# Patient Record
Sex: FEMALE | Race: WHITE | Employment: FULL TIME | URBAN - METROPOLITAN AREA
[De-identification: names, ages, dates, MRNs, and addresses within clinical notes are randomized per-mention and may not be internally consistent; named-entity substitution may affect disease eponyms.]

---

## 2018-06-08 ENCOUNTER — APPOINTMENT (OUTPATIENT)
Dept: GENERAL RADIOLOGY | Age: 43
End: 2018-06-08

## 2018-06-08 ENCOUNTER — APPOINTMENT (OUTPATIENT)
Dept: CT IMAGING | Age: 43
End: 2018-06-08

## 2018-06-08 ENCOUNTER — HOSPITAL ENCOUNTER (EMERGENCY)
Age: 43
Discharge: HOME | End: 2018-06-08
Attending: EMERGENCY MEDICINE

## 2018-06-08 VITALS
DIASTOLIC BLOOD PRESSURE: 90 MMHG | OXYGEN SATURATION: 95 % | SYSTOLIC BLOOD PRESSURE: 135 MMHG | WEIGHT: 177 LBS | HEIGHT: 60 IN | RESPIRATION RATE: 16 BRPM | HEART RATE: 75 BPM | BODY MASS INDEX: 34.75 KG/M2 | TEMPERATURE: 97.6 F

## 2018-06-08 DIAGNOSIS — R10.11 RIGHT UPPER QUADRANT ABDOMINAL PAIN: Primary | ICD-10-CM

## 2018-06-08 LAB
ALBUMIN SERPL-MCNC: 4.1 G/DL (ref 3.5–5.2)
ALP BLD-CCNC: 68 U/L (ref 35–104)
ALT SERPL-CCNC: 25 U/L (ref 0–32)
ANION GAP SERPL CALCULATED.3IONS-SCNC: 15 MMOL/L (ref 7–16)
AST SERPL-CCNC: 44 U/L (ref 0–31)
BASOPHILS ABSOLUTE: 0.04 E9/L (ref 0–0.2)
BASOPHILS RELATIVE PERCENT: 0.3 % (ref 0–2)
BILIRUB SERPL-MCNC: <0.2 MG/DL (ref 0–1.2)
BUN BLDV-MCNC: 15 MG/DL (ref 6–20)
CALCIUM SERPL-MCNC: 10.5 MG/DL (ref 8.6–10.2)
CHLORIDE BLD-SCNC: 110 MMOL/L (ref 98–107)
CO2: 21 MMOL/L (ref 22–29)
CREAT SERPL-MCNC: 0.7 MG/DL (ref 0.5–1)
EKG ATRIAL RATE: 85 BPM
EKG P AXIS: 51 DEGREES
EKG P-R INTERVAL: 142 MS
EKG Q-T INTERVAL: 388 MS
EKG QRS DURATION: 142 MS
EKG QTC CALCULATION (BAZETT): 461 MS
EKG R AXIS: 24 DEGREES
EKG T AXIS: 3 DEGREES
EKG VENTRICULAR RATE: 85 BPM
EOSINOPHILS ABSOLUTE: 0.05 E9/L (ref 0.05–0.5)
EOSINOPHILS RELATIVE PERCENT: 0.4 % (ref 0–6)
GFR AFRICAN AMERICAN: >60
GFR NON-AFRICAN AMERICAN: >60 ML/MIN/1.73
GLUCOSE BLD-MCNC: 118 MG/DL (ref 74–109)
HCT VFR BLD CALC: 45.4 % (ref 34–48)
HEMOGLOBIN: 14.8 G/DL (ref 11.5–15.5)
IMMATURE GRANULOCYTES #: 0.03 E9/L
IMMATURE GRANULOCYTES %: 0.2 % (ref 0–5)
LACTIC ACID: 0.6 MMOL/L (ref 0.5–2.2)
LIPASE: 45 U/L (ref 13–60)
LYMPHOCYTES ABSOLUTE: 1.11 E9/L (ref 1.5–4)
LYMPHOCYTES RELATIVE PERCENT: 8.2 % (ref 20–42)
MCH RBC QN AUTO: 32.2 PG (ref 26–35)
MCHC RBC AUTO-ENTMCNC: 32.6 % (ref 32–34.5)
MCV RBC AUTO: 98.7 FL (ref 80–99.9)
MONOCYTES ABSOLUTE: 0.67 E9/L (ref 0.1–0.95)
MONOCYTES RELATIVE PERCENT: 4.9 % (ref 2–12)
NEUTROPHILS ABSOLUTE: 11.65 E9/L (ref 1.8–7.3)
NEUTROPHILS RELATIVE PERCENT: 86 % (ref 43–80)
PDW BLD-RTO: 12 FL (ref 11.5–15)
PLATELET # BLD: 269 E9/L (ref 130–450)
PMV BLD AUTO: 10.1 FL (ref 7–12)
POTASSIUM SERPL-SCNC: 3.7 MMOL/L (ref 3.5–5)
RBC # BLD: 4.6 E12/L (ref 3.5–5.5)
SODIUM BLD-SCNC: 146 MMOL/L (ref 132–146)
TOTAL PROTEIN: 6.7 G/DL (ref 6.4–8.3)
TROPONIN: <0.01 NG/ML (ref 0–0.03)
WBC # BLD: 13.6 E9/L (ref 4.5–11.5)

## 2018-06-08 PROCEDURE — 71045 X-RAY EXAM CHEST 1 VIEW: CPT

## 2018-06-08 PROCEDURE — 96374 THER/PROPH/DIAG INJ IV PUSH: CPT

## 2018-06-08 PROCEDURE — 6360000002 HC RX W HCPCS: Performed by: PHYSICIAN ASSISTANT

## 2018-06-08 PROCEDURE — 74177 CT ABD & PELVIS W/CONTRAST: CPT

## 2018-06-08 PROCEDURE — 84484 ASSAY OF TROPONIN QUANT: CPT

## 2018-06-08 PROCEDURE — 99285 EMERGENCY DEPT VISIT HI MDM: CPT

## 2018-06-08 PROCEDURE — 83690 ASSAY OF LIPASE: CPT

## 2018-06-08 PROCEDURE — 36415 COLL VENOUS BLD VENIPUNCTURE: CPT

## 2018-06-08 PROCEDURE — 6360000004 HC RX CONTRAST MEDICATION: Performed by: RADIOLOGY

## 2018-06-08 PROCEDURE — 85025 COMPLETE CBC W/AUTO DIFF WBC: CPT

## 2018-06-08 PROCEDURE — 83605 ASSAY OF LACTIC ACID: CPT

## 2018-06-08 PROCEDURE — 80053 COMPREHEN METABOLIC PANEL: CPT

## 2018-06-08 RX ORDER — ONDANSETRON 2 MG/ML
4 INJECTION INTRAMUSCULAR; INTRAVENOUS ONCE
Status: COMPLETED | OUTPATIENT
Start: 2018-06-08 | End: 2018-06-08

## 2018-06-08 RX ORDER — HYDROCODONE BITARTRATE AND ACETAMINOPHEN 5; 325 MG/1; MG/1
1 TABLET ORAL EVERY 6 HOURS PRN
Qty: 10 TABLET | Refills: 0 | Status: SHIPPED | OUTPATIENT
Start: 2018-06-08 | End: 2018-06-11

## 2018-06-08 RX ADMIN — IOPAMIDOL 80 ML: 755 INJECTION, SOLUTION INTRAVENOUS at 21:47

## 2018-06-08 RX ADMIN — IOHEXOL 50 ML: 240 INJECTION, SOLUTION INTRATHECAL; INTRAVASCULAR; INTRAVENOUS; ORAL at 21:47

## 2018-06-08 RX ADMIN — ONDANSETRON 4 MG: 2 INJECTION INTRAMUSCULAR; INTRAVENOUS at 20:36

## 2018-06-08 ASSESSMENT — PAIN DESCRIPTION - LOCATION: LOCATION: ABDOMEN

## 2018-06-08 ASSESSMENT — PAIN SCALES - GENERAL: PAINLEVEL_OUTOF10: 10

## 2018-06-08 ASSESSMENT — PAIN DESCRIPTION - PAIN TYPE: TYPE: ACUTE PAIN

## 2018-06-11 ENCOUNTER — TELEPHONE (OUTPATIENT)
Dept: SURGERY | Age: 43
End: 2018-06-11

## 2018-06-18 ENCOUNTER — INITIAL CONSULT (OUTPATIENT)
Dept: SURGERY | Age: 43
End: 2018-06-18

## 2018-06-18 VITALS
OXYGEN SATURATION: 96 % | BODY MASS INDEX: 33.38 KG/M2 | SYSTOLIC BLOOD PRESSURE: 115 MMHG | HEIGHT: 60 IN | HEART RATE: 81 BPM | DIASTOLIC BLOOD PRESSURE: 62 MMHG | WEIGHT: 170 LBS

## 2018-06-18 DIAGNOSIS — R93.5 ABNORMAL CT OF THE ABDOMEN: ICD-10-CM

## 2018-06-18 DIAGNOSIS — R10.11 RUQ PAIN: Primary | ICD-10-CM

## 2018-06-18 PROCEDURE — 99214 OFFICE O/P EST MOD 30 MIN: CPT | Performed by: SURGERY

## 2018-06-19 ENCOUNTER — TELEPHONE (OUTPATIENT)
Dept: SURGERY | Age: 43
End: 2018-06-19

## 2020-07-03 ENCOUNTER — HOSPITAL ENCOUNTER (EMERGENCY)
Age: 45
Discharge: LEFT BEFORE DISCHARGE | End: 2020-07-03

## 2020-07-03 VITALS — TEMPERATURE: 99.8 F

## 2020-07-07 ENCOUNTER — HOSPITAL ENCOUNTER (EMERGENCY)
Age: 45
Discharge: HOME | End: 2020-07-07
Attending: EMERGENCY MEDICINE

## 2020-07-07 ENCOUNTER — APPOINTMENT (OUTPATIENT)
Dept: CT IMAGING | Age: 45
End: 2020-07-07

## 2020-07-07 VITALS
HEART RATE: 75 BPM | WEIGHT: 165 LBS | OXYGEN SATURATION: 99 % | TEMPERATURE: 98.2 F | BODY MASS INDEX: 32.22 KG/M2 | RESPIRATION RATE: 19 BRPM | SYSTOLIC BLOOD PRESSURE: 135 MMHG | DIASTOLIC BLOOD PRESSURE: 84 MMHG

## 2020-07-07 LAB
BACTERIA: ABNORMAL /HPF
BASOPHILS ABSOLUTE: 0.04 E9/L (ref 0–0.2)
BASOPHILS RELATIVE PERCENT: 0.6 % (ref 0–2)
BILIRUBIN URINE: NEGATIVE
BLOOD, URINE: ABNORMAL
CLARITY: CLEAR
COLOR: YELLOW
EOSINOPHILS ABSOLUTE: 0.08 E9/L (ref 0.05–0.5)
EOSINOPHILS RELATIVE PERCENT: 1.3 % (ref 0–6)
EPITHELIAL CELLS, UA: ABNORMAL /HPF
GLUCOSE URINE: NEGATIVE MG/DL
HCT VFR BLD CALC: 30.8 % (ref 34–48)
HEMOGLOBIN: 9.8 G/DL (ref 11.5–15.5)
IMMATURE GRANULOCYTES #: 0.1 E9/L
IMMATURE GRANULOCYTES %: 1.6 % (ref 0–5)
KETONES, URINE: NEGATIVE MG/DL
LEUKOCYTE ESTERASE, URINE: NEGATIVE
LYMPHOCYTES ABSOLUTE: 1.98 E9/L (ref 1.5–4)
LYMPHOCYTES RELATIVE PERCENT: 31.1 % (ref 20–42)
MCH RBC QN AUTO: 29.1 PG (ref 26–35)
MCHC RBC AUTO-ENTMCNC: 31.8 % (ref 32–34.5)
MCV RBC AUTO: 91.4 FL (ref 80–99.9)
MONOCYTES ABSOLUTE: 0.48 E9/L (ref 0.1–0.95)
MONOCYTES RELATIVE PERCENT: 7.5 % (ref 2–12)
NEUTROPHILS ABSOLUTE: 3.68 E9/L (ref 1.8–7.3)
NEUTROPHILS RELATIVE PERCENT: 57.9 % (ref 43–80)
NITRITE, URINE: POSITIVE
PDW BLD-RTO: 17.1 FL (ref 11.5–15)
PH UA: 5.5 (ref 5–9)
PLATELET # BLD: 510 E9/L (ref 130–450)
PMV BLD AUTO: 8.9 FL (ref 7–12)
PROTEIN UA: NEGATIVE MG/DL
RBC # BLD: 3.37 E12/L (ref 3.5–5.5)
RBC UA: ABNORMAL /HPF (ref 0–2)
SPECIFIC GRAVITY UA: 1.02 (ref 1–1.03)
UROBILINOGEN, URINE: 0.2 E.U./DL
WBC # BLD: 6.4 E9/L (ref 4.5–11.5)
WBC UA: ABNORMAL /HPF (ref 0–5)

## 2020-07-07 PROCEDURE — 74176 CT ABD & PELVIS W/O CONTRAST: CPT

## 2020-07-07 PROCEDURE — 81001 URINALYSIS AUTO W/SCOPE: CPT

## 2020-07-07 PROCEDURE — 99285 EMERGENCY DEPT VISIT HI MDM: CPT

## 2020-07-07 PROCEDURE — 85025 COMPLETE CBC W/AUTO DIFF WBC: CPT

## 2020-07-07 PROCEDURE — 6370000000 HC RX 637 (ALT 250 FOR IP): Performed by: EMERGENCY MEDICINE

## 2020-07-07 PROCEDURE — 71250 CT THORAX DX C-: CPT

## 2020-07-07 RX ORDER — TRAMADOL HYDROCHLORIDE 50 MG/1
50 TABLET ORAL ONCE
Status: COMPLETED | OUTPATIENT
Start: 2020-07-07 | End: 2020-07-07

## 2020-07-07 RX ORDER — AMOXICILLIN AND CLAVULANATE POTASSIUM 875; 125 MG/1; MG/1
1 TABLET, FILM COATED ORAL 2 TIMES DAILY
Qty: 20 TABLET | Refills: 0 | Status: SHIPPED | OUTPATIENT
Start: 2020-07-07 | End: 2020-07-17

## 2020-07-07 RX ORDER — IBUPROFEN 600 MG/1
600 TABLET ORAL EVERY 6 HOURS PRN
Qty: 30 TABLET | Refills: 0 | Status: SHIPPED | OUTPATIENT
Start: 2020-07-07

## 2020-07-07 RX ADMIN — TRAMADOL HYDROCHLORIDE 50 MG: 50 TABLET, FILM COATED ORAL at 14:38

## 2020-07-07 ASSESSMENT — PAIN DESCRIPTION - PAIN TYPE
TYPE: ACUTE PAIN
TYPE: ACUTE PAIN

## 2020-07-07 ASSESSMENT — PAIN SCALES - GENERAL
PAINLEVEL_OUTOF10: 5
PAINLEVEL_OUTOF10: 10
PAINLEVEL_OUTOF10: 10

## 2020-07-07 ASSESSMENT — PAIN DESCRIPTION - PROGRESSION: CLINICAL_PROGRESSION: NOT CHANGED

## 2020-07-07 ASSESSMENT — PAIN DESCRIPTION - FREQUENCY
FREQUENCY: CONTINUOUS
FREQUENCY: CONTINUOUS

## 2020-07-07 ASSESSMENT — PAIN DESCRIPTION - DESCRIPTORS
DESCRIPTORS: SHARP
DESCRIPTORS: THROBBING

## 2020-07-07 ASSESSMENT — PAIN DESCRIPTION - ONSET: ONSET: SUDDEN

## 2020-07-07 ASSESSMENT — PAIN DESCRIPTION - LOCATION
LOCATION: HIP
LOCATION: HIP;LEG

## 2020-07-07 ASSESSMENT — PAIN - FUNCTIONAL ASSESSMENT: PAIN_FUNCTIONAL_ASSESSMENT: PREVENTS OR INTERFERES SOME ACTIVE ACTIVITIES AND ADLS

## 2020-07-07 ASSESSMENT — PAIN DESCRIPTION - ORIENTATION: ORIENTATION: LEFT
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
## 2020-07-07 NOTE — ED NOTES
Bed: H2  Expected date:   Expected time:   Means of arrival:   Comments:  Ruslan Gibson, KATEY  07/07/20 1015
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
## 2023-11-28 ENCOUNTER — OFFICE VISIT (OUTPATIENT)
Dept: FAMILY MEDICINE CLINIC | Age: 48
End: 2023-11-28
Payer: COMMERCIAL

## 2023-11-28 VITALS
HEART RATE: 83 BPM | WEIGHT: 152 LBS | RESPIRATION RATE: 14 BRPM | OXYGEN SATURATION: 98 % | DIASTOLIC BLOOD PRESSURE: 100 MMHG | SYSTOLIC BLOOD PRESSURE: 138 MMHG | BODY MASS INDEX: 29.84 KG/M2 | HEIGHT: 60 IN | TEMPERATURE: 97.2 F

## 2023-11-28 DIAGNOSIS — Z98.84 HISTORY OF GASTRIC BYPASS: ICD-10-CM

## 2023-11-28 DIAGNOSIS — E78.5 DYSLIPIDEMIA: ICD-10-CM

## 2023-11-28 DIAGNOSIS — R03.0 ELEVATED BLOOD PRESSURE READING: ICD-10-CM

## 2023-11-28 DIAGNOSIS — Z11.59 NEED FOR HEPATITIS C SCREENING TEST: ICD-10-CM

## 2023-11-28 DIAGNOSIS — Z12.11 COLON CANCER SCREENING: ICD-10-CM

## 2023-11-28 DIAGNOSIS — F17.210 CIGARETTE NICOTINE DEPENDENCE WITHOUT COMPLICATION: ICD-10-CM

## 2023-11-28 DIAGNOSIS — I10 ESSENTIAL HYPERTENSION: Primary | ICD-10-CM

## 2023-11-28 DIAGNOSIS — G40.909 SEIZURE DISORDER (HCC): ICD-10-CM

## 2023-11-28 DIAGNOSIS — E55.9 VITAMIN D DEFICIENCY: ICD-10-CM

## 2023-11-28 DIAGNOSIS — Z11.4 SCREENING FOR HIV (HUMAN IMMUNODEFICIENCY VIRUS): ICD-10-CM

## 2023-11-28 DIAGNOSIS — Z12.31 BREAST CANCER SCREENING BY MAMMOGRAM: ICD-10-CM

## 2023-11-28 PROCEDURE — 99204 OFFICE O/P NEW MOD 45 MIN: CPT | Performed by: FAMILY MEDICINE

## 2023-11-28 PROCEDURE — 3075F SYST BP GE 130 - 139MM HG: CPT | Performed by: FAMILY MEDICINE

## 2023-11-28 PROCEDURE — 3080F DIAST BP >= 90 MM HG: CPT | Performed by: FAMILY MEDICINE

## 2023-11-28 RX ORDER — AMLODIPINE BESYLATE 5 MG/1
5 TABLET ORAL DAILY
Qty: 30 TABLET | Refills: 0 | Status: SHIPPED | OUTPATIENT
Start: 2023-11-28

## 2023-11-28 SDOH — ECONOMIC STABILITY: HOUSING INSECURITY
IN THE LAST 12 MONTHS, WAS THERE A TIME WHEN YOU DID NOT HAVE A STEADY PLACE TO SLEEP OR SLEPT IN A SHELTER (INCLUDING NOW)?: NO

## 2023-11-28 SDOH — ECONOMIC STABILITY: FOOD INSECURITY: WITHIN THE PAST 12 MONTHS, THE FOOD YOU BOUGHT JUST DIDN'T LAST AND YOU DIDN'T HAVE MONEY TO GET MORE.: NEVER TRUE

## 2023-11-28 SDOH — ECONOMIC STABILITY: INCOME INSECURITY: HOW HARD IS IT FOR YOU TO PAY FOR THE VERY BASICS LIKE FOOD, HOUSING, MEDICAL CARE, AND HEATING?: NOT HARD AT ALL

## 2023-11-28 SDOH — ECONOMIC STABILITY: FOOD INSECURITY: WITHIN THE PAST 12 MONTHS, YOU WORRIED THAT YOUR FOOD WOULD RUN OUT BEFORE YOU GOT MONEY TO BUY MORE.: NEVER TRUE

## 2023-11-28 ASSESSMENT — COLUMBIA-SUICIDE SEVERITY RATING SCALE - C-SSRS
4. HAVE YOU HAD THESE THOUGHTS AND HAD SOME INTENTION OF ACTING ON THEM?: NO
7. DID THIS OCCUR IN THE LAST THREE MONTHS: NO
5. HAVE YOU STARTED TO WORK OUT OR WORKED OUT THE DETAILS OF HOW TO KILL YOURSELF? DO YOU INTEND TO CARRY OUT THIS PLAN?: NO
3. HAVE YOU BEEN THINKING ABOUT HOW YOU MIGHT KILL YOURSELF?: NO

## 2023-11-28 ASSESSMENT — PATIENT HEALTH QUESTIONNAIRE - PHQ9
5. POOR APPETITE OR OVEREATING: 0
2. FEELING DOWN, DEPRESSED OR HOPELESS: 0
3. TROUBLE FALLING OR STAYING ASLEEP: 2
1. LITTLE INTEREST OR PLEASURE IN DOING THINGS: 0
9. THOUGHTS THAT YOU WOULD BE BETTER OFF DEAD, OR OF HURTING YOURSELF: 0
7. TROUBLE CONCENTRATING ON THINGS, SUCH AS READING THE NEWSPAPER OR WATCHING TELEVISION: 3
SUM OF ALL RESPONSES TO PHQ QUESTIONS 1-9: 8
6. FEELING BAD ABOUT YOURSELF - OR THAT YOU ARE A FAILURE OR HAVE LET YOURSELF OR YOUR FAMILY DOWN: 0
4. FEELING TIRED OR HAVING LITTLE ENERGY: 3
10. IF YOU CHECKED OFF ANY PROBLEMS, HOW DIFFICULT HAVE THESE PROBLEMS MADE IT FOR YOU TO DO YOUR WORK, TAKE CARE OF THINGS AT HOME, OR GET ALONG WITH OTHER PEOPLE: 2
SUM OF ALL RESPONSES TO PHQ QUESTIONS 1-9: 8
SUM OF ALL RESPONSES TO PHQ9 QUESTIONS 1 & 2: 0
SUM OF ALL RESPONSES TO PHQ QUESTIONS 1-9: 8
8. MOVING OR SPEAKING SO SLOWLY THAT OTHER PEOPLE COULD HAVE NOTICED. OR THE OPPOSITE, BEING SO FIGETY OR RESTLESS THAT YOU HAVE BEEN MOVING AROUND A LOT MORE THAN USUAL: 0
SUM OF ALL RESPONSES TO PHQ QUESTIONS 1-9: 8

## 2023-11-28 ASSESSMENT — ENCOUNTER SYMPTOMS
BACK PAIN: 1
SHORTNESS OF BREATH: 0

## 2023-11-28 NOTE — PROGRESS NOTES
Shilpi Aguilar   Patient is a 46y.o. year old female who presents with:  Chief Complaint   Patient presents with    New Patient     Establishing care; last PCP more than 5 years ago Dr Cami Hirsch; Last Blood work wa 5 years ago; no specialty provider; Last Pap 9 yrs ago Dr Ladan Neely; Las mammogram 6 yrs ago; No colonoscopy in last 10 yrs ago     Patient also follows with: none    HPI    Hx gastric bypass 2004    Chronic L arm pain  Onset 15 years ago  Gradually worsening  Now involving entire hand/all digits    Pt w hx spine surgery w Dr. San Nim  LLE pain  Previously improved with gabapentin    Seizure disorder  Listed in [address]  Claims she has had multiple seizures in the past, none for 12 years. Had seen a local neurologist and one through [facility]. Had been on medication but stopped on her own around 12 years ago. Pt attributes the seizures to stress related to prior marriage - now . Elevated BP  Treated prior to bariatric surgery  Does not check BP at home  BP Readings from Last 3 Encounters:   11/28/23 (!) 138/100   07/07/20 135/84   06/18/18 115/62     Dyslipidemia  Current treatment: None  Recent changes in medication: None  Indications for statin therapy include: FHx premature ascvd - both parents  Lab Results   Component Value Date    CHOL 180 02/25/2016    HDL 46 02/25/2016    LDLCALC 110 (H) 02/25/2016    TRIG 120 02/25/2016   The ASCVD Risk score (Iman BREEN, et al., 2019) failed to calculate for the following reasons:    Cannot find a previous HDL lab    Cannot find a previous total cholesterol lab       Nicotine  Smokes 1ppd, has cut down from 2ppd      Review of Systems   Constitutional:  Negative for unexpected weight change. Respiratory:  Negative for shortness of breath. Cardiovascular:  Negative for chest pain and leg swelling. Musculoskeletal:  Positive for back pain and neck pain. Neurological:  Positive for numbness. Negative for seizures.        Health Maintenance Due   Topic Date Due

## 2023-11-30 ENCOUNTER — HOSPITAL ENCOUNTER (OUTPATIENT)
Age: 48
Discharge: HOME | End: 2023-11-30
Payer: COMMERCIAL

## 2023-11-30 DIAGNOSIS — F17.210 CIGARETTE NICOTINE DEPENDENCE WITHOUT COMPLICATION: ICD-10-CM

## 2023-11-30 DIAGNOSIS — E55.9 VITAMIN D DEFICIENCY: ICD-10-CM

## 2023-11-30 DIAGNOSIS — Z11.59 NEED FOR HEPATITIS C SCREENING TEST: ICD-10-CM

## 2023-11-30 DIAGNOSIS — R03.0 ELEVATED BLOOD PRESSURE READING: ICD-10-CM

## 2023-11-30 DIAGNOSIS — Z12.31 BREAST CANCER SCREENING BY MAMMOGRAM: ICD-10-CM

## 2023-11-30 DIAGNOSIS — Z98.84 HISTORY OF GASTRIC BYPASS: ICD-10-CM

## 2023-11-30 DIAGNOSIS — G40.909 SEIZURE DISORDER (HCC): ICD-10-CM

## 2023-11-30 DIAGNOSIS — Z11.4 SCREENING FOR HIV (HUMAN IMMUNODEFICIENCY VIRUS): ICD-10-CM

## 2023-11-30 DIAGNOSIS — Z12.11 COLON CANCER SCREENING: ICD-10-CM

## 2023-11-30 DIAGNOSIS — I10 ESSENTIAL HYPERTENSION: ICD-10-CM

## 2023-11-30 DIAGNOSIS — E78.5 DYSLIPIDEMIA: ICD-10-CM

## 2023-11-30 LAB
25(OH)D3 SERPL-MCNC: 55.6 NG/ML (ref 30–100)
ALBUMIN SERPL-MCNC: 4.4 G/DL (ref 3.5–5.2)
ALP SERPL-CCNC: 83 U/L (ref 35–104)
ALT SERPL-CCNC: 11 U/L (ref 0–32)
ANION GAP SERPL CALCULATED.3IONS-SCNC: 11 MMOL/L (ref 7–16)
AST SERPL-CCNC: 17 U/L (ref 0–31)
BASOPHILS # BLD: 0.07 K/UL (ref 0–0.2)
BASOPHILS NFR BLD: 1 % (ref 0–2)
BILIRUB SERPL-MCNC: 0.3 MG/DL (ref 0–1.2)
BUN SERPL-MCNC: 13 MG/DL (ref 6–20)
CALCIUM SERPL-MCNC: 10.9 MG/DL (ref 8.6–10.2)
CHLORIDE SERPL-SCNC: 107 MMOL/L (ref 98–107)
CHOLEST SERPL-MCNC: 190 MG/DL
CO2 SERPL-SCNC: 25 MMOL/L (ref 22–29)
CREAT SERPL-MCNC: 0.7 MG/DL (ref 0.5–1)
EOSINOPHIL # BLD: 0.12 K/UL (ref 0.05–0.5)
EOSINOPHILS RELATIVE PERCENT: 1 % (ref 0–6)
ERYTHROCYTE [DISTWIDTH] IN BLOOD BY AUTOMATED COUNT: 13.8 % (ref 11.5–15)
FOLATE SERPL-MCNC: >20 NG/ML (ref 4.8–24.2)
GFR SERPL CREATININE-BSD FRML MDRD: >60 ML/MIN/1.73M2
GLUCOSE SERPL-MCNC: 92 MG/DL (ref 74–99)
HCT VFR BLD AUTO: 47.5 % (ref 34–48)
HDLC SERPL-MCNC: 49 MG/DL
HGB BLD-MCNC: 15.2 G/DL (ref 11.5–15.5)
IMM GRANULOCYTES # BLD AUTO: 0.03 K/UL (ref 0–0.58)
IMM GRANULOCYTES NFR BLD: 0 % (ref 0–5)
LDLC SERPL CALC-MCNC: 110 MG/DL
LYMPHOCYTES NFR BLD: 2.75 K/UL (ref 1.5–4)
LYMPHOCYTES RELATIVE PERCENT: 24 % (ref 20–42)
MCH RBC QN AUTO: 30.5 PG (ref 26–35)
MCHC RBC AUTO-ENTMCNC: 32 G/DL (ref 32–34.5)
MCV RBC AUTO: 95.4 FL (ref 80–99.9)
MONOCYTES NFR BLD: 0.6 K/UL (ref 0.1–0.95)
MONOCYTES NFR BLD: 5 % (ref 2–12)
NEUTROPHILS NFR BLD: 69 % (ref 43–80)
NEUTS SEG NFR BLD: 7.86 K/UL (ref 1.8–7.3)
PLATELET # BLD AUTO: 310 K/UL (ref 130–450)
PMV BLD AUTO: 10.2 FL (ref 7–12)
POTASSIUM SERPL-SCNC: 4.5 MMOL/L (ref 3.5–5)
PROT SERPL-MCNC: 7.2 G/DL (ref 6.4–8.3)
RBC # BLD AUTO: 4.98 M/UL (ref 3.5–5.5)
SODIUM SERPL-SCNC: 143 MMOL/L (ref 132–146)
T4 FREE SERPL-MCNC: 0.9 NG/DL (ref 0.9–1.7)
TRIGL SERPL-MCNC: 153 MG/DL
TSH SERPL DL<=0.05 MIU/L-ACNC: 0.49 UIU/ML (ref 0.27–4.2)
VIT B12 SERPL-MCNC: 492 PG/ML (ref 211–946)
VLDLC SERPL CALC-MCNC: 31 MG/DL
WBC OTHER # BLD: 11.4 K/UL (ref 4.5–11.5)

## 2023-11-30 PROCEDURE — 82746 ASSAY OF FOLIC ACID SERUM: CPT

## 2023-11-30 PROCEDURE — 82306 VITAMIN D 25 HYDROXY: CPT

## 2023-11-30 PROCEDURE — 80053 COMPREHEN METABOLIC PANEL: CPT

## 2023-11-30 PROCEDURE — 84443 ASSAY THYROID STIM HORMONE: CPT

## 2023-11-30 PROCEDURE — 87389 HIV-1 AG W/HIV-1&-2 AB AG IA: CPT

## 2023-11-30 PROCEDURE — 82607 VITAMIN B-12: CPT

## 2023-11-30 PROCEDURE — 36415 COLL VENOUS BLD VENIPUNCTURE: CPT

## 2023-11-30 PROCEDURE — 80061 LIPID PANEL: CPT

## 2023-11-30 PROCEDURE — 86803 HEPATITIS C AB TEST: CPT

## 2023-11-30 PROCEDURE — 85025 COMPLETE CBC W/AUTO DIFF WBC: CPT

## 2023-11-30 PROCEDURE — 84439 ASSAY OF FREE THYROXINE: CPT

## 2023-12-01 LAB
HCV AB SERPL QL IA: NONREACTIVE
HIV 1+2 AB+HIV1 P24 AG SERPL QL IA: NONREACTIVE

## 2023-12-12 ENCOUNTER — OFFICE VISIT (OUTPATIENT)
Dept: FAMILY MEDICINE CLINIC | Age: 48
End: 2023-12-12
Payer: COMMERCIAL

## 2023-12-12 VITALS
DIASTOLIC BLOOD PRESSURE: 92 MMHG | HEIGHT: 60 IN | RESPIRATION RATE: 16 BRPM | WEIGHT: 153.9 LBS | OXYGEN SATURATION: 98 % | SYSTOLIC BLOOD PRESSURE: 146 MMHG | BODY MASS INDEX: 30.22 KG/M2 | TEMPERATURE: 97.8 F | HEART RATE: 69 BPM

## 2023-12-12 DIAGNOSIS — E78.5 DYSLIPIDEMIA: Chronic | ICD-10-CM

## 2023-12-12 DIAGNOSIS — Z12.31 BREAST CANCER SCREENING BY MAMMOGRAM: ICD-10-CM

## 2023-12-12 DIAGNOSIS — Z12.11 COLON CANCER SCREENING: ICD-10-CM

## 2023-12-12 DIAGNOSIS — G40.909 SEIZURE DISORDER (HCC): Chronic | ICD-10-CM

## 2023-12-12 DIAGNOSIS — F17.210 CIGARETTE NICOTINE DEPENDENCE WITHOUT COMPLICATION: Chronic | ICD-10-CM

## 2023-12-12 DIAGNOSIS — J01.10 ACUTE NON-RECURRENT FRONTAL SINUSITIS: Primary | ICD-10-CM

## 2023-12-12 DIAGNOSIS — I10 ESSENTIAL HYPERTENSION: Chronic | ICD-10-CM

## 2023-12-12 PROCEDURE — 3080F DIAST BP >= 90 MM HG: CPT | Performed by: FAMILY MEDICINE

## 2023-12-12 PROCEDURE — 3074F SYST BP LT 130 MM HG: CPT | Performed by: FAMILY MEDICINE

## 2023-12-12 PROCEDURE — 99214 OFFICE O/P EST MOD 30 MIN: CPT | Performed by: FAMILY MEDICINE

## 2023-12-12 RX ORDER — AMLODIPINE BESYLATE 10 MG/1
10 TABLET ORAL DAILY
Qty: 30 TABLET | Refills: 1 | Status: SHIPPED | OUTPATIENT
Start: 2023-12-12

## 2023-12-12 RX ORDER — AMOXICILLIN AND CLAVULANATE POTASSIUM 875; 125 MG/1; MG/1
1 TABLET, FILM COATED ORAL 2 TIMES DAILY
Qty: 14 TABLET | Refills: 0 | Status: SHIPPED | OUTPATIENT
Start: 2023-12-12 | End: 2023-12-19

## 2023-12-12 ASSESSMENT — ENCOUNTER SYMPTOMS
SHORTNESS OF BREATH: 0
BACK PAIN: 1
RHINORRHEA: 1
SINUS PRESSURE: 1

## 2024-01-12 ENCOUNTER — OFFICE VISIT (OUTPATIENT)
Dept: FAMILY MEDICINE CLINIC | Age: 49
End: 2024-01-12
Payer: COMMERCIAL

## 2024-01-12 VITALS
WEIGHT: 152 LBS | HEART RATE: 78 BPM | RESPIRATION RATE: 16 BRPM | TEMPERATURE: 97.1 F | OXYGEN SATURATION: 98 % | HEIGHT: 60 IN | SYSTOLIC BLOOD PRESSURE: 132 MMHG | BODY MASS INDEX: 29.84 KG/M2 | DIASTOLIC BLOOD PRESSURE: 70 MMHG

## 2024-01-12 DIAGNOSIS — I10 ESSENTIAL HYPERTENSION: Chronic | ICD-10-CM

## 2024-01-12 DIAGNOSIS — M54.2 NECK PAIN ON RIGHT SIDE: Primary | ICD-10-CM

## 2024-01-12 PROCEDURE — 3075F SYST BP GE 130 - 139MM HG: CPT | Performed by: FAMILY MEDICINE

## 2024-01-12 PROCEDURE — 99214 OFFICE O/P EST MOD 30 MIN: CPT | Performed by: FAMILY MEDICINE

## 2024-01-12 PROCEDURE — 3078F DIAST BP <80 MM HG: CPT | Performed by: FAMILY MEDICINE

## 2024-01-12 RX ORDER — METHYLPREDNISOLONE 4 MG/1
TABLET ORAL
Qty: 1 KIT | Refills: 0 | Status: SHIPPED | OUTPATIENT
Start: 2024-01-12 | End: 2024-01-18

## 2024-01-12 RX ORDER — POLYETHYLENE GLYCOL 3350 17 G
17 POWDER IN PACKET (EA) ORAL DAILY
COMMUNITY
Start: 2023-12-29

## 2024-01-12 RX ORDER — CYCLOBENZAPRINE HCL 5 MG
5 TABLET ORAL 3 TIMES DAILY PRN
Qty: 30 TABLET | Refills: 0 | Status: SHIPPED | OUTPATIENT
Start: 2024-01-12 | End: 2024-01-22

## 2024-01-12 RX ORDER — GABAPENTIN 300 MG/1
CAPSULE ORAL
Qty: 90 CAPSULE | Refills: 1 | Status: SHIPPED | OUTPATIENT
Start: 2024-01-12 | End: 2024-02-11

## 2024-01-12 RX ORDER — AMLODIPINE BESYLATE 10 MG/1
10 TABLET ORAL DAILY
Qty: 90 TABLET | Refills: 0 | Status: SHIPPED | OUTPATIENT
Start: 2024-01-12

## 2024-01-12 ASSESSMENT — PATIENT HEALTH QUESTIONNAIRE - PHQ9
6. FEELING BAD ABOUT YOURSELF - OR THAT YOU ARE A FAILURE OR HAVE LET YOURSELF OR YOUR FAMILY DOWN: 0
SUM OF ALL RESPONSES TO PHQ9 QUESTIONS 1 & 2: 1
1. LITTLE INTEREST OR PLEASURE IN DOING THINGS: 0
SUM OF ALL RESPONSES TO PHQ QUESTIONS 1-9: 11
SUM OF ALL RESPONSES TO PHQ QUESTIONS 1-9: 11
4. FEELING TIRED OR HAVING LITTLE ENERGY: 3
7. TROUBLE CONCENTRATING ON THINGS, SUCH AS READING THE NEWSPAPER OR WATCHING TELEVISION: 2
2. FEELING DOWN, DEPRESSED OR HOPELESS: 1
8. MOVING OR SPEAKING SO SLOWLY THAT OTHER PEOPLE COULD HAVE NOTICED. OR THE OPPOSITE, BEING SO FIGETY OR RESTLESS THAT YOU HAVE BEEN MOVING AROUND A LOT MORE THAN USUAL: 0
SUM OF ALL RESPONSES TO PHQ QUESTIONS 1-9: 11
3. TROUBLE FALLING OR STAYING ASLEEP: 3
9. THOUGHTS THAT YOU WOULD BE BETTER OFF DEAD, OR OF HURTING YOURSELF: 0
5. POOR APPETITE OR OVEREATING: 2
SUM OF ALL RESPONSES TO PHQ QUESTIONS 1-9: 11
10. IF YOU CHECKED OFF ANY PROBLEMS, HOW DIFFICULT HAVE THESE PROBLEMS MADE IT FOR YOU TO DO YOUR WORK, TAKE CARE OF THINGS AT HOME, OR GET ALONG WITH OTHER PEOPLE: 2

## 2024-01-12 ASSESSMENT — ENCOUNTER SYMPTOMS
SINUS PRESSURE: 0
RHINORRHEA: 0
SHORTNESS OF BREATH: 0
BACK PAIN: 1

## 2024-01-12 NOTE — PROGRESS NOTES
Valerie Pulido   Patient is a 48 y.o. year old female who presents with:  Chief Complaint   Patient presents with    Neck Pain     Right side of neck pain off and on; numbness in hands and arms worse     Patient also follows with: none    Hx gastric bypass 2004      HPI    Neck pain  Onset: 1 week ago after no known injury, noticed when getting out of bed  Progression: stable  Timing: waxes and wanes  Location: R posterolateral neck  Character: throbbing  Intensity: 4/10, worst was 200/10 - pain is worst in the UE  Exacerbating factors: palpation  Remitting factors: tylenol mildly effective, nothing else has been tried  Admits to radiation down the b/l UE L>R into the hands L index to middle and R pinky and thumb.   Reports that the hand pain has been going on for a couple months but recently got worse with the neck pain  Admits to decreased neck ROM.  No f/c, CP, SOB.   Tolerated gabapentin, flexeril, cs in the past.     Past relevant imaging  MRI cervical 2013:  Impression-   1. 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   2. Minimal disc bulging at C4-C5 and C5-C6. No stenosis.     Hypertension  Current treatment amlodipine 10 mg daily  Recent changes in treatment: Increased amlodipine dose  BP Readings from Last 3 Encounters:   01/12/24 132/70   12/12/23 (!) 146/92   11/28/23 (!) 138/100     Dyslipidemia  Current treatment: None  Recent changes in medication: None  Indications for statin therapy include: None but of relevance family history of premature ASCVD in parents  Lab Results   Component Value Date    CHOL 190 11/30/2023    HDL 49 11/30/2023    LDLCALC 110 (H) 02/25/2016    TRIG 153 (H) 11/30/2023   The 10-year ASCVD risk score (Iman BREEN, et al., 2019) is: 5.4%    Values used to calculate the score:      Age: 48 years      Sex: Female      Is Non- : No      Diabetic: No      Tobacco smoker: Yes      Systolic Blood Pressure:

## 2024-01-22 ENCOUNTER — HOSPITAL ENCOUNTER (OUTPATIENT)
Dept: GENERAL RADIOLOGY | Age: 49
Discharge: HOME | End: 2024-01-24
Payer: COMMERCIAL

## 2024-01-22 ENCOUNTER — HOSPITAL ENCOUNTER (OUTPATIENT)
Age: 49
Discharge: HOME | End: 2024-01-22
Payer: COMMERCIAL

## 2024-01-22 DIAGNOSIS — M54.2 NECK PAIN ON RIGHT SIDE: ICD-10-CM

## 2024-01-22 PROCEDURE — 72040 X-RAY EXAM NECK SPINE 2-3 VW: CPT

## 2024-03-10 DIAGNOSIS — M54.2 NECK PAIN ON RIGHT SIDE: ICD-10-CM

## 2024-03-11 RX ORDER — GABAPENTIN 300 MG/1
300 CAPSULE ORAL 3 TIMES DAILY
Qty: 42 CAPSULE | Refills: 0 | Status: SHIPPED | OUTPATIENT
Start: 2024-03-11 | End: 2024-05-08

## 2024-04-10 ENCOUNTER — TELEPHONE (OUTPATIENT)
Dept: FAMILY MEDICINE CLINIC | Age: 49
End: 2024-04-10

## 2024-04-10 NOTE — TELEPHONE ENCOUNTER
I rec'd a call on the Nurse Triage Line stating patient is c/o body tremors and headaches.  The call was warm transferred and patient stated she's always had headaches, but they're becoming more frequent and violent.  Patient stated she's also having body tremors.  I asked if she is having any other symptoms and she stated, yes.  Patient became silent and I asked her what other symptoms and she replied, if I tell you you're going to tell me to go to the ED.  I informed patient that I would advise her to go to the ED because of violent headaches and body tremors.  Patient stated she is not going to the ED because she doesn't have a vehicle and is not calling an ambulance.  I asked her what her other symptoms are and she stated, chest pains.  I advised patient to go to the ED right away and instructed her to call 911 or I would be happy to call for her, if she would like.  Patient stated her  is there with her.  Patient told her  that I advised that she go to the ED.  I asked her if I could speak w/her  and he came to the phone.  I advised him of the importance of calling 911 now.  He stated ok.  Msg routed, for informational purposes.    Last seen 1/12/2024  Next appt Visit date not found

## 2024-04-15 ENCOUNTER — HOSPITAL ENCOUNTER (EMERGENCY)
Age: 49
Discharge: HOME | End: 2024-04-15
Attending: EMERGENCY MEDICINE
Payer: COMMERCIAL

## 2024-04-15 ENCOUNTER — APPOINTMENT (OUTPATIENT)
Dept: CT IMAGING | Age: 49
End: 2024-04-15
Payer: COMMERCIAL

## 2024-04-15 VITALS
HEIGHT: 60 IN | OXYGEN SATURATION: 98 % | RESPIRATION RATE: 25 BRPM | DIASTOLIC BLOOD PRESSURE: 104 MMHG | SYSTOLIC BLOOD PRESSURE: 170 MMHG | WEIGHT: 154 LBS | TEMPERATURE: 97.9 F | HEART RATE: 84 BPM | BODY MASS INDEX: 30.23 KG/M2

## 2024-04-15 DIAGNOSIS — R51.9 NONINTRACTABLE HEADACHE, UNSPECIFIED CHRONICITY PATTERN, UNSPECIFIED HEADACHE TYPE: Primary | ICD-10-CM

## 2024-04-15 DIAGNOSIS — K59.00 CONSTIPATION, UNSPECIFIED CONSTIPATION TYPE: ICD-10-CM

## 2024-04-15 DIAGNOSIS — G93.5 CHIARI MALFORMATION TYPE I (HCC): ICD-10-CM

## 2024-04-15 LAB
ALBUMIN SERPL-MCNC: 3.9 G/DL (ref 3.5–5.2)
ALP SERPL-CCNC: 110 U/L (ref 35–104)
ALT SERPL-CCNC: 12 U/L (ref 0–32)
ANION GAP SERPL CALCULATED.3IONS-SCNC: 13 MMOL/L (ref 7–16)
AST SERPL-CCNC: 24 U/L (ref 0–31)
BASOPHILS # BLD: 0.06 K/UL (ref 0–0.2)
BASOPHILS NFR BLD: 0 % (ref 0–2)
BILIRUB SERPL-MCNC: 0.2 MG/DL (ref 0–1.2)
BILIRUB UR QL STRIP: NEGATIVE
BUN SERPL-MCNC: 10 MG/DL (ref 6–20)
CALCIUM SERPL-MCNC: 10.1 MG/DL (ref 8.6–10.2)
CHLORIDE SERPL-SCNC: 110 MMOL/L (ref 98–107)
CLARITY UR: CLEAR
CO2 SERPL-SCNC: 23 MMOL/L (ref 22–29)
COLOR UR: YELLOW
COMMENT: ABNORMAL
CREAT SERPL-MCNC: 0.7 MG/DL (ref 0.5–1)
EOSINOPHIL # BLD: 0.07 K/UL (ref 0.05–0.5)
EOSINOPHILS RELATIVE PERCENT: 1 % (ref 0–6)
ERYTHROCYTE [DISTWIDTH] IN BLOOD BY AUTOMATED COUNT: 13.5 % (ref 11.5–15)
GFR SERPL CREATININE-BSD FRML MDRD: >90 ML/MIN/1.73M2
GLUCOSE SERPL-MCNC: 94 MG/DL (ref 74–99)
GLUCOSE UR STRIP-MCNC: NEGATIVE MG/DL
HCT VFR BLD AUTO: 39.9 % (ref 34–48)
HGB BLD-MCNC: 12.8 G/DL (ref 11.5–15.5)
HGB UR QL STRIP.AUTO: NEGATIVE
IMM GRANULOCYTES # BLD AUTO: 0.03 K/UL (ref 0–0.58)
IMM GRANULOCYTES NFR BLD: 0 % (ref 0–5)
KETONES UR STRIP-MCNC: NEGATIVE MG/DL
LACTATE BLDV-SCNC: 1.4 MMOL/L (ref 0.5–2.2)
LEUKOCYTE ESTERASE UR QL STRIP: NEGATIVE
LIPASE SERPL-CCNC: 40 U/L (ref 13–60)
LYMPHOCYTES NFR BLD: 2.19 K/UL (ref 1.5–4)
LYMPHOCYTES RELATIVE PERCENT: 16 % (ref 20–42)
MAGNESIUM SERPL-MCNC: 2.1 MG/DL (ref 1.6–2.6)
MCH RBC QN AUTO: 30.4 PG (ref 26–35)
MCHC RBC AUTO-ENTMCNC: 32.1 G/DL (ref 32–34.5)
MCV RBC AUTO: 94.8 FL (ref 80–99.9)
MONOCYTES NFR BLD: 0.72 K/UL (ref 0.1–0.95)
MONOCYTES NFR BLD: 5 % (ref 2–12)
NEUTROPHILS NFR BLD: 78 % (ref 43–80)
NEUTS SEG NFR BLD: 10.76 K/UL (ref 1.8–7.3)
NITRITE UR QL STRIP: NEGATIVE
PH UR STRIP: 6.5 [PH] (ref 5–9)
PLATELET # BLD AUTO: 351 K/UL (ref 130–450)
PMV BLD AUTO: 10.2 FL (ref 7–12)
POTASSIUM SERPL-SCNC: 4.3 MMOL/L (ref 3.5–5)
PROT SERPL-MCNC: 6.5 G/DL (ref 6.4–8.3)
PROT UR STRIP-MCNC: NEGATIVE MG/DL
RBC # BLD AUTO: 4.21 M/UL (ref 3.5–5.5)
SODIUM SERPL-SCNC: 146 MMOL/L (ref 132–146)
SP GR UR STRIP: <1.005 (ref 1–1.03)
TROPONIN I SERPL HS-MCNC: 17 NG/L (ref 0–9)
UROBILINOGEN UR STRIP-ACNC: 0.2 EU/DL (ref 0–1)
WBC OTHER # BLD: 13.8 K/UL (ref 4.5–11.5)

## 2024-04-15 PROCEDURE — 81003 URINALYSIS AUTO W/O SCOPE: CPT

## 2024-04-15 PROCEDURE — 74176 CT ABD & PELVIS W/O CONTRAST: CPT

## 2024-04-15 PROCEDURE — 6360000002 HC RX W HCPCS: Performed by: EMERGENCY MEDICINE

## 2024-04-15 PROCEDURE — 96361 HYDRATE IV INFUSION ADD-ON: CPT

## 2024-04-15 PROCEDURE — 96375 TX/PRO/DX INJ NEW DRUG ADDON: CPT

## 2024-04-15 PROCEDURE — 84484 ASSAY OF TROPONIN QUANT: CPT

## 2024-04-15 PROCEDURE — 96374 THER/PROPH/DIAG INJ IV PUSH: CPT

## 2024-04-15 PROCEDURE — 99284 EMERGENCY DEPT VISIT MOD MDM: CPT

## 2024-04-15 PROCEDURE — 70450 CT HEAD/BRAIN W/O DYE: CPT

## 2024-04-15 PROCEDURE — 83690 ASSAY OF LIPASE: CPT

## 2024-04-15 PROCEDURE — 2580000003 HC RX 258: Performed by: EMERGENCY MEDICINE

## 2024-04-15 PROCEDURE — 85025 COMPLETE CBC W/AUTO DIFF WBC: CPT

## 2024-04-15 PROCEDURE — 83605 ASSAY OF LACTIC ACID: CPT

## 2024-04-15 PROCEDURE — 80053 COMPREHEN METABOLIC PANEL: CPT

## 2024-04-15 PROCEDURE — 83735 ASSAY OF MAGNESIUM: CPT

## 2024-04-15 RX ORDER — POLYETHYLENE GLYCOL 3350 17 G/17G
17 POWDER, FOR SOLUTION ORAL 2 TIMES DAILY PRN
Qty: 510 G | Refills: 0 | Status: SHIPPED | OUTPATIENT
Start: 2024-04-15 | End: 2024-05-15

## 2024-04-15 RX ORDER — 0.9 % SODIUM CHLORIDE 0.9 %
1000 INTRAVENOUS SOLUTION INTRAVENOUS ONCE
Status: COMPLETED | OUTPATIENT
Start: 2024-04-15 | End: 2024-04-15

## 2024-04-15 RX ORDER — PROCHLORPERAZINE EDISYLATE 5 MG/ML
10 INJECTION INTRAMUSCULAR; INTRAVENOUS ONCE
Status: COMPLETED | OUTPATIENT
Start: 2024-04-15 | End: 2024-04-15

## 2024-04-15 RX ORDER — SUMATRIPTAN 100 MG/1
100 TABLET, FILM COATED ORAL
Qty: 9 TABLET | Refills: 0 | Status: SHIPPED | OUTPATIENT
Start: 2024-04-15 | End: 2024-04-15

## 2024-04-15 RX ORDER — DIPHENHYDRAMINE HYDROCHLORIDE 50 MG/ML
25 INJECTION INTRAMUSCULAR; INTRAVENOUS ONCE
Status: COMPLETED | OUTPATIENT
Start: 2024-04-15 | End: 2024-04-15

## 2024-04-15 RX ADMIN — SODIUM CHLORIDE 1000 ML: 9 INJECTION, SOLUTION INTRAVENOUS at 17:58

## 2024-04-15 RX ADMIN — DIPHENHYDRAMINE HYDROCHLORIDE 25 MG: 50 INJECTION, SOLUTION INTRAMUSCULAR; INTRAVENOUS at 18:00

## 2024-04-15 RX ADMIN — PROCHLORPERAZINE EDISYLATE 10 MG: 5 INJECTION INTRAMUSCULAR; INTRAVENOUS at 18:00

## 2024-04-15 ASSESSMENT — ENCOUNTER SYMPTOMS
ABDOMINAL PAIN: 1
NAUSEA: 0
SHORTNESS OF BREATH: 0
VOMITING: 0
CHEST TIGHTNESS: 0
WHEEZING: 0
BACK PAIN: 0
SORE THROAT: 0
ABDOMINAL DISTENTION: 0
SINUS PRESSURE: 0
COUGH: 0
DIARRHEA: 0

## 2024-04-15 ASSESSMENT — PAIN SCALES - GENERAL: PAINLEVEL_OUTOF10: 8

## 2024-04-15 ASSESSMENT — LIFESTYLE VARIABLES
HOW OFTEN DO YOU HAVE A DRINK CONTAINING ALCOHOL: NEVER
HOW MANY STANDARD DRINKS CONTAINING ALCOHOL DO YOU HAVE ON A TYPICAL DAY: PATIENT DOES NOT DRINK

## 2024-04-15 ASSESSMENT — PAIN - FUNCTIONAL ASSESSMENT: PAIN_FUNCTIONAL_ASSESSMENT: 0-10

## 2024-04-15 ASSESSMENT — PAIN DESCRIPTION - LOCATION: LOCATION: HEAD

## 2024-04-15 NOTE — ED PROVIDER NOTES
Chief complaint:  Headache      HPI history provided by the patient  The patient presents complaining of 3 days or so of pain all over.  She states she gets a headache across her whole head that goes down her right jaw down her throat and chest and sternum down to her abdomen and has abdominal pain and just does not feel good and has body aches.  Denies fevers, sweats or chills and denies cough or congestion or runny nose.  No nausea or vomiting or diarrhea.  No dysuria.  No extremity numbness, tingling, paresthesia or weakness.  No fall or injury.    Review of Systems   Constitutional:  Negative for chills, diaphoresis, fatigue and fever.   HENT:  Negative for congestion, sinus pressure and sore throat.    Respiratory:  Negative for cough, chest tightness, shortness of breath and wheezing.    Cardiovascular:  Negative for chest pain, palpitations and leg swelling.   Gastrointestinal:  Positive for abdominal pain. Negative for abdominal distention, diarrhea, nausea and vomiting.   Genitourinary:  Negative for dysuria, flank pain, frequency and urgency.   Musculoskeletal:  Negative for arthralgias, back pain, gait problem, joint swelling, myalgias, neck pain and neck stiffness.   Skin:  Negative for rash and wound.   Neurological:  Positive for headaches. Negative for dizziness, seizures, syncope, weakness, light-headedness and numbness.   All other systems reviewed and are negative.       Physical Exam  Vitals and nursing note reviewed.   Constitutional:       General: She is awake. She is not in acute distress.     Appearance: She is well-developed. She is not ill-appearing, toxic-appearing or diaphoretic.   HENT:      Head: Normocephalic and atraumatic.      Comments: No sign of acute head or face injury  Eyes:      General: No scleral icterus.     Pupils: Pupils are equal, round, and reactive to light.   Cardiovascular:      Rate and Rhythm: Regular rhythm. Tachycardia present.      Heart sounds: Normal heart

## 2024-05-08 ENCOUNTER — OFFICE VISIT (OUTPATIENT)
Dept: FAMILY MEDICINE CLINIC | Age: 49
End: 2024-05-08
Payer: COMMERCIAL

## 2024-05-08 VITALS
TEMPERATURE: 97.2 F | BODY MASS INDEX: 28.43 KG/M2 | WEIGHT: 144.8 LBS | HEART RATE: 80 BPM | HEIGHT: 60 IN | DIASTOLIC BLOOD PRESSURE: 96 MMHG | SYSTOLIC BLOOD PRESSURE: 150 MMHG | RESPIRATION RATE: 16 BRPM | OXYGEN SATURATION: 98 %

## 2024-05-08 DIAGNOSIS — R07.89 CHEST WALL PAIN: Primary | ICD-10-CM

## 2024-05-08 DIAGNOSIS — G40.909 SEIZURE DISORDER (HCC): Chronic | ICD-10-CM

## 2024-05-08 DIAGNOSIS — F40.240 CLAUSTROPHOBIA: ICD-10-CM

## 2024-05-08 DIAGNOSIS — G93.5 CHIARI I MALFORMATION (HCC): ICD-10-CM

## 2024-05-08 DIAGNOSIS — I10 ESSENTIAL HYPERTENSION: Chronic | ICD-10-CM

## 2024-05-08 PROCEDURE — 99214 OFFICE O/P EST MOD 30 MIN: CPT | Performed by: FAMILY MEDICINE

## 2024-05-08 PROCEDURE — 3080F DIAST BP >= 90 MM HG: CPT | Performed by: FAMILY MEDICINE

## 2024-05-08 PROCEDURE — 3077F SYST BP >= 140 MM HG: CPT | Performed by: FAMILY MEDICINE

## 2024-05-08 RX ORDER — AMLODIPINE BESYLATE 10 MG/1
10 TABLET ORAL DAILY
Qty: 30 TABLET | Refills: 0 | Status: SHIPPED | OUTPATIENT
Start: 2024-05-08

## 2024-05-08 RX ORDER — LORAZEPAM 0.5 MG/1
TABLET ORAL
Qty: 2 TABLET | Refills: 0 | Status: SHIPPED | OUTPATIENT
Start: 2024-05-08 | End: 2024-06-08

## 2024-05-08 ASSESSMENT — PATIENT HEALTH QUESTIONNAIRE - PHQ9
10. IF YOU CHECKED OFF ANY PROBLEMS, HOW DIFFICULT HAVE THESE PROBLEMS MADE IT FOR YOU TO DO YOUR WORK, TAKE CARE OF THINGS AT HOME, OR GET ALONG WITH OTHER PEOPLE: NOT DIFFICULT AT ALL
SUM OF ALL RESPONSES TO PHQ9 QUESTIONS 1 & 2: 4
9. THOUGHTS THAT YOU WOULD BE BETTER OFF DEAD, OR OF HURTING YOURSELF: NOT AT ALL
4. FEELING TIRED OR HAVING LITTLE ENERGY: MORE THAN HALF THE DAYS
SUM OF ALL RESPONSES TO PHQ QUESTIONS 1-9: 8
3. TROUBLE FALLING OR STAYING ASLEEP: MORE THAN HALF THE DAYS
6. FEELING BAD ABOUT YOURSELF - OR THAT YOU ARE A FAILURE OR HAVE LET YOURSELF OR YOUR FAMILY DOWN: NOT AT ALL
2. FEELING DOWN, DEPRESSED OR HOPELESS: MORE THAN HALF THE DAYS
7. TROUBLE CONCENTRATING ON THINGS, SUCH AS READING THE NEWSPAPER OR WATCHING TELEVISION: NOT AT ALL
1. LITTLE INTEREST OR PLEASURE IN DOING THINGS: MORE THAN HALF THE DAYS
5. POOR APPETITE OR OVEREATING: NOT AT ALL
8. MOVING OR SPEAKING SO SLOWLY THAT OTHER PEOPLE COULD HAVE NOTICED. OR THE OPPOSITE, BEING SO FIGETY OR RESTLESS THAT YOU HAVE BEEN MOVING AROUND A LOT MORE THAN USUAL: NOT AT ALL
SUM OF ALL RESPONSES TO PHQ QUESTIONS 1-9: 8

## 2024-05-08 ASSESSMENT — ENCOUNTER SYMPTOMS
RHINORRHEA: 0
SINUS PRESSURE: 0
SHORTNESS OF BREATH: 0
BACK PAIN: 1

## 2024-05-08 NOTE — PROGRESS NOTES
Valerie Pulido   Patient is a 48 y.o. year old female who presents with:  Chief Complaint   Patient presents with    Follow-up     4/15/24 Headache and abdominal pain; Constipated at the time per pt and she has had a bowel movement every day but pain has increased in intensity and frequency; States she using stool softners, Tums and gas x; Goes to [facility] around the new year ([facility])     Patient also follows with: none    Hx gastric bypass 2004      HPI    ED f/u  Pt was seen in the ED 4/15/2024 w complain of HA, abdominal pain. CT head, CT abd/pelvis, labs, UA, CXR were done.    HA  Onset around age 3yo.  Intermittent problem since then.   Gradually worsening; now radiating into the neck and shoulders.   States she has seen neurology through [facility] for the HA, told no reason for the HA per pt.    She does not believe MRI of the brain has ever been done, MRI cervical spine as below.  Admits to intermittent blurred vision, admits to numbness in the fingertips, problems with tremor and fine motor movements, neck pain.   Hx of seizure and has initial appt with neurology 6/2024  MRI cervical spine 2013 as below, pt does not recall being informed of the results.   Impression-   1. 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   2. Minimal disc bulging at C4-C5 and C5-C6. No stenosis.     L chest pain  Onset around one month ago  Gradually worsening  Constant dull pain at the left lower chest/ribs and with occasional \"explosion\" across to the back, across the lower chest and to the substernal region.   Exacerbating factors: physical exertion of any type, breathing at times, being alive  Remitting factors: heating pad mildly effective, handful of edibles  Tylenol, gas x, TUMS w/o effect  Admits to occasional nausea and dry heaving. No blood in the stool. Admits to SOB.   Denies hemoptysis, leg swelling.   States she has had many

## 2024-05-24 ENCOUNTER — OFFICE VISIT (OUTPATIENT)
Age: 49
End: 2024-05-24
Payer: COMMERCIAL

## 2024-05-24 VITALS
BODY MASS INDEX: 28.02 KG/M2 | WEIGHT: 142.7 LBS | DIASTOLIC BLOOD PRESSURE: 105 MMHG | TEMPERATURE: 98.2 F | SYSTOLIC BLOOD PRESSURE: 191 MMHG | HEIGHT: 60 IN | HEART RATE: 99 BPM

## 2024-05-24 DIAGNOSIS — G43.E11 INTRACTABLE CHRONIC MIGRAINE WITH AURA WITH STATUS MIGRAINOSUS: ICD-10-CM

## 2024-05-24 DIAGNOSIS — R20.0 BILATERAL HAND NUMBNESS: ICD-10-CM

## 2024-05-24 DIAGNOSIS — T39.95XA ANALGESIC REBOUND HEADACHE: ICD-10-CM

## 2024-05-24 DIAGNOSIS — G25.2 ACTION TREMOR: ICD-10-CM

## 2024-05-24 DIAGNOSIS — G44.40 ANALGESIC REBOUND HEADACHE: ICD-10-CM

## 2024-05-24 DIAGNOSIS — Q07.00 ARNOLD-CHIARI MALFORMATION (HCC): Primary | ICD-10-CM

## 2024-05-24 DIAGNOSIS — G89.4 CHRONIC PAIN SYNDROME: ICD-10-CM

## 2024-05-24 PROCEDURE — 3077F SYST BP >= 140 MM HG: CPT | Performed by: PSYCHIATRY & NEUROLOGY

## 2024-05-24 PROCEDURE — 99204 OFFICE O/P NEW MOD 45 MIN: CPT | Performed by: PSYCHIATRY & NEUROLOGY

## 2024-05-24 PROCEDURE — 3080F DIAST BP >= 90 MM HG: CPT | Performed by: PSYCHIATRY & NEUROLOGY

## 2024-05-24 RX ORDER — RIZATRIPTAN BENZOATE 10 MG/1
10 TABLET ORAL PRN
Qty: 9 TABLET | Refills: 3 | Status: SHIPPED | OUTPATIENT
Start: 2024-05-24

## 2024-05-24 RX ORDER — METHOCARBAMOL 500 MG/1
500 TABLET, FILM COATED ORAL 3 TIMES DAILY
Qty: 90 TABLET | Refills: 3 | Status: SHIPPED | OUTPATIENT
Start: 2024-05-24

## 2024-05-24 RX ORDER — ERENUMAB-AOOE 70 MG/ML
70 INJECTION SUBCUTANEOUS
Qty: 1 ML | Refills: 3 | Status: SHIPPED | OUTPATIENT
Start: 2024-05-24

## 2024-05-24 RX ORDER — PROPRANOLOL HYDROCHLORIDE 10 MG/1
10 TABLET ORAL 2 TIMES DAILY
Qty: 60 TABLET | Refills: 3 | Status: SHIPPED | OUTPATIENT
Start: 2024-05-24

## 2024-05-25 ENCOUNTER — HOSPITAL ENCOUNTER (OUTPATIENT)
Dept: MRI IMAGING | Age: 49
End: 2024-05-25
Payer: COMMERCIAL

## 2024-05-25 DIAGNOSIS — G93.5 CHIARI I MALFORMATION (HCC): ICD-10-CM

## 2024-05-25 PROCEDURE — 70551 MRI BRAIN STEM W/O DYE: CPT

## 2024-05-25 PROCEDURE — 72146 MRI CHEST SPINE W/O DYE: CPT

## 2024-05-25 PROCEDURE — 72141 MRI NECK SPINE W/O DYE: CPT

## 2024-05-25 PROCEDURE — 72148 MRI LUMBAR SPINE W/O DYE: CPT

## 2024-05-28 ENCOUNTER — OFFICE VISIT (OUTPATIENT)
Dept: FAMILY MEDICINE CLINIC | Age: 49
End: 2024-05-28
Payer: COMMERCIAL

## 2024-05-28 VITALS
SYSTOLIC BLOOD PRESSURE: 106 MMHG | WEIGHT: 142.6 LBS | RESPIRATION RATE: 16 BRPM | HEART RATE: 70 BPM | OXYGEN SATURATION: 99 % | TEMPERATURE: 97.2 F | DIASTOLIC BLOOD PRESSURE: 64 MMHG | BODY MASS INDEX: 28 KG/M2 | HEIGHT: 60 IN

## 2024-05-28 DIAGNOSIS — R07.89 CHEST WALL PAIN: ICD-10-CM

## 2024-05-28 DIAGNOSIS — Z12.31 BREAST CANCER SCREENING BY MAMMOGRAM: ICD-10-CM

## 2024-05-28 DIAGNOSIS — E55.9 VITAMIN D DEFICIENCY: ICD-10-CM

## 2024-05-28 DIAGNOSIS — I10 ESSENTIAL HYPERTENSION: ICD-10-CM

## 2024-05-28 DIAGNOSIS — F17.210 CIGARETTE NICOTINE DEPENDENCE WITHOUT COMPLICATION: Chronic | ICD-10-CM

## 2024-05-28 DIAGNOSIS — G93.5 CHIARI I MALFORMATION (HCC): Primary | ICD-10-CM

## 2024-05-28 DIAGNOSIS — G40.909 SEIZURE DISORDER (HCC): Chronic | ICD-10-CM

## 2024-05-28 DIAGNOSIS — Z23 NEED FOR PNEUMOCOCCAL VACCINATION: ICD-10-CM

## 2024-05-28 PROCEDURE — 90471 IMMUNIZATION ADMIN: CPT | Performed by: FAMILY MEDICINE

## 2024-05-28 PROCEDURE — 99214 OFFICE O/P EST MOD 30 MIN: CPT | Performed by: FAMILY MEDICINE

## 2024-05-28 PROCEDURE — 90677 PCV20 VACCINE IM: CPT | Performed by: FAMILY MEDICINE

## 2024-05-28 PROCEDURE — 3078F DIAST BP <80 MM HG: CPT | Performed by: FAMILY MEDICINE

## 2024-05-28 PROCEDURE — 3074F SYST BP LT 130 MM HG: CPT | Performed by: FAMILY MEDICINE

## 2024-05-28 ASSESSMENT — PATIENT HEALTH QUESTIONNAIRE - PHQ9
SUM OF ALL RESPONSES TO PHQ QUESTIONS 1-9: 0
SUM OF ALL RESPONSES TO PHQ QUESTIONS 1-9: 0
1. LITTLE INTEREST OR PLEASURE IN DOING THINGS: NOT AT ALL
SUM OF ALL RESPONSES TO PHQ QUESTIONS 1-9: 0
2. FEELING DOWN, DEPRESSED OR HOPELESS: NOT AT ALL
SUM OF ALL RESPONSES TO PHQ9 QUESTIONS 1 & 2: 0
SUM OF ALL RESPONSES TO PHQ QUESTIONS 1-9: 0

## 2024-05-28 ASSESSMENT — ENCOUNTER SYMPTOMS
BACK PAIN: 1
SHORTNESS OF BREATH: 0
RHINORRHEA: 0
SINUS PRESSURE: 0

## 2024-05-28 NOTE — PROGRESS NOTES
cancer screen  2018    Shingles vaccine (1 of 2) Never done     BCS: ordered 2023, 2024  Pneumo: agreeable    Current Outpatient Medications   Medication Sig Dispense Refill    Erenumab-aooe (AIMOVIG) 70 MG/ML SOAJ Inject 70 mg into the skin every 28 days 1 mL 3    propranolol (INDERAL) 10 MG tablet Take 1 tablet by mouth 2 times daily 60 tablet 3    rizatriptan (MAXALT) 10 MG tablet Take 1 tablet by mouth as needed for Migraine (< 2 days a week) May repeat in 2 hours if needed 9 tablet 3    methocarbamol (ROBAXIN) 500 MG tablet Take 1 tablet by mouth 3 times daily 90 tablet 3    amLODIPine (NORVASC) 10 MG tablet Take 1 tablet by mouth daily 30 tablet 0    LORazepam (ATIVAN) 0.5 MG tablet Take 1 tablet 60 min prior to MRI, if inadequate response can take additional 1 tablet 30 minutes later. 2 tablet 0    SUMAtriptan (IMITREX) 100 MG tablet Take 1 tablet by mouth once as needed for Migraine 9 tablet 0    gabapentin (NEURONTIN) 300 MG capsule Take 1 capsule by mouth 3 times daily for 14 days. Need apt for additional refills 42 capsule 0    RA LAXATIVE 17 GM/SCOOP powder Take 17 g by mouth daily      NONFORMULARY Edibles for shoulder, arm pain; bones in left leg       No current facility-administered medications for this visit.       History    Past Medical History:   Diagnosis Date    Chest pain 2012    Chronic back pain     Chronic neck pain     Depression     DJD (degenerative joint disease) of lumbar spine     Dyslipidemia 2023    Essential hypertension 2023    Grand mal seizure (HCC)     9 years ago    History of cardiovascular stress test 2012    LEXISCAN    Hypoglycemic syndrome     Nausea & vomiting     Ovarian cyst     Spina bifida (HCC)        Past Surgical History:   Procedure Laterality Date    BACK SURGERY       SECTION      CHOLECYSTECTOMY, LAPAROSCOPIC  3/11/16    with IOC    COLONOSCOPY      ECHO COMPL W DOP COLOR FLOW  2012         ENDOSCOPY,

## 2024-06-02 DIAGNOSIS — I10 ESSENTIAL HYPERTENSION: Chronic | ICD-10-CM

## 2024-06-03 RX ORDER — AMLODIPINE BESYLATE 10 MG/1
10 TABLET ORAL DAILY
Qty: 30 TABLET | Refills: 0 | Status: SHIPPED | OUTPATIENT
Start: 2024-06-03

## 2024-06-03 NOTE — TELEPHONE ENCOUNTER
Last seen 5/28/2024  Next appt 11/19/2024    Requested Prescriptions     Pending Prescriptions Disp Refills    amLODIPine (NORVASC) 10 MG tablet [Pharmacy Med Name: amLODIPine Besylate 10 MG Oral Tablet] 30 tablet 0     Sig: Take 1 tablet by mouth once daily

## 2024-06-07 ENCOUNTER — OFFICE VISIT (OUTPATIENT)
Dept: PAIN MANAGEMENT | Age: 49
End: 2024-06-07
Payer: COMMERCIAL

## 2024-06-07 VITALS
WEIGHT: 142 LBS | OXYGEN SATURATION: 96 % | RESPIRATION RATE: 16 BRPM | DIASTOLIC BLOOD PRESSURE: 78 MMHG | HEIGHT: 60 IN | SYSTOLIC BLOOD PRESSURE: 118 MMHG | HEART RATE: 61 BPM | TEMPERATURE: 97.2 F | BODY MASS INDEX: 27.88 KG/M2

## 2024-06-07 DIAGNOSIS — G89.4 CHRONIC PAIN SYNDROME: Primary | ICD-10-CM

## 2024-06-07 DIAGNOSIS — M79.10 MYALGIA: ICD-10-CM

## 2024-06-07 DIAGNOSIS — M54.2 CERVICALGIA: ICD-10-CM

## 2024-06-07 DIAGNOSIS — M79.7 FIBROMYALGIA: ICD-10-CM

## 2024-06-07 PROCEDURE — 3074F SYST BP LT 130 MM HG: CPT | Performed by: PAIN MEDICINE

## 2024-06-07 PROCEDURE — 3078F DIAST BP <80 MM HG: CPT | Performed by: PAIN MEDICINE

## 2024-06-07 PROCEDURE — 99205 OFFICE O/P NEW HI 60 MIN: CPT

## 2024-06-07 PROCEDURE — 99205 OFFICE O/P NEW HI 60 MIN: CPT | Performed by: PAIN MEDICINE

## 2024-06-07 RX ORDER — PREGABALIN 150 MG/1
150 CAPSULE ORAL 2 TIMES DAILY
Qty: 60 CAPSULE | Refills: 1 | Status: SHIPPED | OUTPATIENT
Start: 2024-06-14 | End: 2024-08-13

## 2024-06-07 RX ORDER — PREGABALIN 75 MG/1
75 CAPSULE ORAL 2 TIMES DAILY
Qty: 14 CAPSULE | Refills: 0 | Status: SHIPPED | OUTPATIENT
Start: 2024-06-07 | End: 2024-06-14

## 2024-06-07 NOTE — PROGRESS NOTES
[facility]        [address] [address]  Dept: [phone]          Consult Note      Patient:  PRADEEP Dolan 1975    Date of Service:  24     Requesting Physician:  Lucinda Sweeney MD    Reason for Consult:      Patient presents with complaints of bilateral neck pain that started >1 years ago    HISTORY OF PRESENT ILLNESS:      Pain is constant and is described as aching, stabbing, throbbing, and shooting.     Pain does radiate to the upper extremities. She  has numbness, tingling, weakness of the the upper extremities and does not have bladder or bowel dysfunction.    Alleviating factors include: limiting, ice, heat.  Aggravating factors include:  overdoing it.     She has not been on anticoagulation medications to include ASA, NSAIDS, Plavix, heparin, LMW heparin, and warfarin and has not been on herbal supplements.  She is not diabetic.    Imagin/2024 MRI brain w/o -  FINDINGS:  INTRACRANIAL STRUCTURES/VENTRICLES: There is no acute infarct. Chronic  infarctions are seen in the bilateral posterior parietal regions, somewhat  larger on the left.     No generalized cerebral volume loss.  Apart from punctate chronic ischemic  insult in the left frontal subcortical white matter, no significant chronic  microvascular ischemic changes are noted in the cerebral white matter.  No  hydrocephalus or extra-axial fluid is seen.     There is cerebellar tonsillar ectopia by approximately 5 mm.     ORBITS: The visualized portion of the orbits demonstrate no acute abnormality.     SINUSES: Minimal mucosal thickening in the paranasal sinuses.  The mastoids  are clear.     BONES/SOFT TISSUES: The bone marrow signal intensity appears normal. The soft  tissues demonstrate no acute abnormality.     IMPRESSION:  1. No acute intracranial abnormality.  2. 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  3.

## 2024-06-07 NOTE — PROGRESS NOTES
Valerie Pulido presents to the [facility] on 6/7/2024. Valerie is complaining of pain cervical. The pain is constant. The pain is described as aching, throbbing, shooting, and stabbing. Pain is rated on her best day at a 5, on her worst day at a 10, and on average at a 8 on the VAS scale. She took her last dose of Neurontin .    Any procedures since your last visit: No  She is not on NSAIDS and  is not on anticoagulation medications      Pacemaker or defibrillator: No       Medication Contract and Consent for Opioid Use Documents Filed       Patient Documents       Type of Document Status Date Received Received By Description    Medication Contract [Status Missing]  MICHELE DAVIS 4/21/14 Neurosurgery Medication Contract                       /78   Pulse 61   Temp 97.2 °F (36.2 °C) (Temporal)   Resp 16   Ht 1.524 m (5')   Wt 64.4 kg (142 lb)   SpO2 96%   BMI 27.73 kg/m²      No LMP recorded. Patient has had a hysterectomy.

## 2024-06-30 DIAGNOSIS — I10 ESSENTIAL HYPERTENSION: Chronic | ICD-10-CM

## 2024-07-01 RX ORDER — AMLODIPINE BESYLATE 10 MG/1
10 TABLET ORAL DAILY
Qty: 90 TABLET | Refills: 1 | Status: SHIPPED | OUTPATIENT
Start: 2024-07-01

## 2024-07-09 ENCOUNTER — HOSPITAL ENCOUNTER (OUTPATIENT)
Age: 49
Discharge: HOME | End: 2024-07-09
Payer: MEDICAID

## 2024-07-09 ENCOUNTER — TELEPHONE (OUTPATIENT)
Dept: FAMILY MEDICINE CLINIC | Age: 49
End: 2024-07-09

## 2024-07-09 DIAGNOSIS — E83.52 HYPERCALCEMIA: Primary | ICD-10-CM

## 2024-07-09 LAB
25(OH)D3 SERPL-MCNC: 47.6 NG/ML (ref 30–100)
ALBUMIN SERPL-MCNC: 4.2 G/DL (ref 3.5–5.2)
ALP SERPL-CCNC: 107 U/L (ref 35–104)
ALT SERPL-CCNC: 15 U/L (ref 0–32)
ANION GAP SERPL CALCULATED.3IONS-SCNC: 10 MMOL/L (ref 7–16)
AST SERPL-CCNC: 22 U/L (ref 0–31)
BASOPHILS # BLD: 0.06 K/UL (ref 0–0.2)
BASOPHILS NFR BLD: 1 % (ref 0–2)
BILIRUB SERPL-MCNC: 0.3 MG/DL (ref 0–1.2)
BUN SERPL-MCNC: 16 MG/DL (ref 6–20)
CALCIUM SERPL-MCNC: 11 MG/DL (ref 8.6–10.2)
CHLORIDE SERPL-SCNC: 112 MMOL/L (ref 98–107)
CHOLEST SERPL-MCNC: 172 MG/DL
CO2 SERPL-SCNC: 25 MMOL/L (ref 22–29)
CREAT SERPL-MCNC: 0.6 MG/DL (ref 0.5–1)
EOSINOPHIL # BLD: 0.14 K/UL (ref 0.05–0.5)
EOSINOPHILS RELATIVE PERCENT: 1 % (ref 0–6)
ERYTHROCYTE [DISTWIDTH] IN BLOOD BY AUTOMATED COUNT: 15.8 % (ref 11.5–15)
GFR, ESTIMATED: >90 ML/MIN/1.73M2
GLUCOSE SERPL-MCNC: 95 MG/DL (ref 74–99)
HCT VFR BLD AUTO: 39.1 % (ref 34–48)
HDLC SERPL-MCNC: 59 MG/DL
HGB BLD-MCNC: 12.2 G/DL (ref 11.5–15.5)
IMM GRANULOCYTES # BLD AUTO: 0.04 K/UL (ref 0–0.58)
IMM GRANULOCYTES NFR BLD: 0 % (ref 0–5)
LDLC SERPL CALC-MCNC: 93 MG/DL
LYMPHOCYTES NFR BLD: 2.51 K/UL (ref 1.5–4)
LYMPHOCYTES RELATIVE PERCENT: 24 % (ref 20–42)
MCH RBC QN AUTO: 28.9 PG (ref 26–35)
MCHC RBC AUTO-ENTMCNC: 31.2 G/DL (ref 32–34.5)
MCV RBC AUTO: 92.7 FL (ref 80–99.9)
MONOCYTES NFR BLD: 0.55 K/UL (ref 0.1–0.95)
MONOCYTES NFR BLD: 5 % (ref 2–12)
NEUTROPHILS NFR BLD: 69 % (ref 43–80)
NEUTS SEG NFR BLD: 7.24 K/UL (ref 1.8–7.3)
PLATELET # BLD AUTO: 358 K/UL (ref 130–450)
PMV BLD AUTO: 9.6 FL (ref 7–12)
POTASSIUM SERPL-SCNC: 4.7 MMOL/L (ref 3.5–5)
PROT SERPL-MCNC: 7 G/DL (ref 6.4–8.3)
RBC # BLD AUTO: 4.22 M/UL (ref 3.5–5.5)
SODIUM SERPL-SCNC: 147 MMOL/L (ref 132–146)
T4 FREE SERPL-MCNC: 0.9 NG/DL (ref 0.9–1.7)
TRIGL SERPL-MCNC: 99 MG/DL
TSH SERPL DL<=0.05 MIU/L-ACNC: 0.55 UIU/ML (ref 0.27–4.2)
VLDLC SERPL CALC-MCNC: 20 MG/DL
WBC OTHER # BLD: 10.5 K/UL (ref 4.5–11.5)

## 2024-07-09 PROCEDURE — 82306 VITAMIN D 25 HYDROXY: CPT

## 2024-07-09 PROCEDURE — 80061 LIPID PANEL: CPT

## 2024-07-09 PROCEDURE — 80053 COMPREHEN METABOLIC PANEL: CPT

## 2024-07-09 PROCEDURE — 84443 ASSAY THYROID STIM HORMONE: CPT

## 2024-07-09 PROCEDURE — 85025 COMPLETE CBC W/AUTO DIFF WBC: CPT

## 2024-07-09 PROCEDURE — 84439 ASSAY OF FREE THYROXINE: CPT

## 2024-07-09 PROCEDURE — 36415 COLL VENOUS BLD VENIPUNCTURE: CPT

## 2024-07-09 NOTE — TELEPHONE ENCOUNTER
Ca elevated, some additional tests were ordered as add on. If they cannot be added to sample will have drawn in the future.

## 2024-07-10 ENCOUNTER — HOSPITAL ENCOUNTER (OUTPATIENT)
Age: 49
Discharge: HOME | End: 2024-07-10
Payer: MEDICAID

## 2024-07-10 DIAGNOSIS — E83.52 HYPERCALCEMIA: ICD-10-CM

## 2024-07-10 LAB
CA-I BLD-SCNC: 1.33 MMOL/L (ref 1.15–1.33)
PTH-INTACT SERPL-MCNC: 75.8 PG/ML (ref 15–65)

## 2024-07-10 PROCEDURE — 36415 COLL VENOUS BLD VENIPUNCTURE: CPT

## 2024-07-10 PROCEDURE — 82330 ASSAY OF CALCIUM: CPT

## 2024-07-10 PROCEDURE — 83970 ASSAY OF PARATHORMONE: CPT

## 2024-07-10 PROCEDURE — 82652 VIT D 1 25-DIHYDROXY: CPT

## 2024-07-10 NOTE — TELEPHONE ENCOUNTER
Valerie notified the lab called and do not have the correct tubes to perform new testing; she stated she will go to [facility] today

## 2024-07-13 LAB — 1,25(OH)2D3 SERPL-MCNC: 74.9 PG/ML (ref 19.9–79.3)

## 2024-07-16 ENCOUNTER — OFFICE VISIT (OUTPATIENT)
Dept: FAMILY MEDICINE CLINIC | Age: 49
End: 2024-07-16
Payer: MEDICAID

## 2024-07-16 VITALS
WEIGHT: 150 LBS | DIASTOLIC BLOOD PRESSURE: 68 MMHG | SYSTOLIC BLOOD PRESSURE: 98 MMHG | OXYGEN SATURATION: 98 % | HEIGHT: 60 IN | HEART RATE: 65 BPM | TEMPERATURE: 98.1 F | RESPIRATION RATE: 16 BRPM | BODY MASS INDEX: 29.45 KG/M2

## 2024-07-16 DIAGNOSIS — F32.A ANXIETY AND DEPRESSION: ICD-10-CM

## 2024-07-16 DIAGNOSIS — E21.3 HYPERPARATHYROIDISM (HCC): ICD-10-CM

## 2024-07-16 DIAGNOSIS — E83.52 HYPERCALCEMIA: ICD-10-CM

## 2024-07-16 DIAGNOSIS — I10 ESSENTIAL HYPERTENSION: Primary | ICD-10-CM

## 2024-07-16 DIAGNOSIS — G93.5 CHIARI I MALFORMATION (HCC): ICD-10-CM

## 2024-07-16 DIAGNOSIS — Z12.31 BREAST CANCER SCREENING BY MAMMOGRAM: ICD-10-CM

## 2024-07-16 DIAGNOSIS — F41.9 ANXIETY AND DEPRESSION: ICD-10-CM

## 2024-07-16 DIAGNOSIS — R09.81 NASAL CONGESTION: ICD-10-CM

## 2024-07-16 PROCEDURE — 3017F COLORECTAL CA SCREEN DOC REV: CPT | Performed by: FAMILY MEDICINE

## 2024-07-16 PROCEDURE — 4004F PT TOBACCO SCREEN RCVD TLK: CPT | Performed by: FAMILY MEDICINE

## 2024-07-16 PROCEDURE — G8427 DOCREV CUR MEDS BY ELIG CLIN: HCPCS | Performed by: FAMILY MEDICINE

## 2024-07-16 PROCEDURE — G8417 CALC BMI ABV UP PARAM F/U: HCPCS | Performed by: FAMILY MEDICINE

## 2024-07-16 PROCEDURE — 3078F DIAST BP <80 MM HG: CPT | Performed by: FAMILY MEDICINE

## 2024-07-16 PROCEDURE — 99214 OFFICE O/P EST MOD 30 MIN: CPT | Performed by: FAMILY MEDICINE

## 2024-07-16 PROCEDURE — 3074F SYST BP LT 130 MM HG: CPT | Performed by: FAMILY MEDICINE

## 2024-07-16 RX ORDER — DULOXETIN HYDROCHLORIDE 30 MG/1
30 CAPSULE, DELAYED RELEASE ORAL DAILY
Qty: 7 CAPSULE | Refills: 0 | Status: SHIPPED | OUTPATIENT
Start: 2024-07-16 | End: 2024-07-23

## 2024-07-16 RX ORDER — DULOXETIN HYDROCHLORIDE 60 MG/1
60 CAPSULE, DELAYED RELEASE ORAL DAILY
Qty: 30 CAPSULE | Refills: 1 | Status: SHIPPED | OUTPATIENT
Start: 2024-07-23

## 2024-07-16 RX ORDER — FLUTICASONE PROPIONATE 50 MCG
1 SPRAY, SUSPENSION (ML) NASAL DAILY
Qty: 16 G | Refills: 0 | Status: SHIPPED | OUTPATIENT
Start: 2024-07-16

## 2024-07-16 ASSESSMENT — PATIENT HEALTH QUESTIONNAIRE - PHQ9
SUM OF ALL RESPONSES TO PHQ QUESTIONS 1-9: 16
SUM OF ALL RESPONSES TO PHQ QUESTIONS 1-9: 16
7. TROUBLE CONCENTRATING ON THINGS, SUCH AS READING THE NEWSPAPER OR WATCHING TELEVISION: NEARLY EVERY DAY
1. LITTLE INTEREST OR PLEASURE IN DOING THINGS: SEVERAL DAYS
SUM OF ALL RESPONSES TO PHQ QUESTIONS 1-9: 15
2. FEELING DOWN, DEPRESSED OR HOPELESS: MORE THAN HALF THE DAYS
10. IF YOU CHECKED OFF ANY PROBLEMS, HOW DIFFICULT HAVE THESE PROBLEMS MADE IT FOR YOU TO DO YOUR WORK, TAKE CARE OF THINGS AT HOME, OR GET ALONG WITH OTHER PEOPLE: VERY DIFFICULT
5. POOR APPETITE OR OVEREATING: SEVERAL DAYS
3. TROUBLE FALLING OR STAYING ASLEEP: MORE THAN HALF THE DAYS
SUM OF ALL RESPONSES TO PHQ9 QUESTIONS 1 & 2: 3
6. FEELING BAD ABOUT YOURSELF - OR THAT YOU ARE A FAILURE OR HAVE LET YOURSELF OR YOUR FAMILY DOWN: NEARLY EVERY DAY
4. FEELING TIRED OR HAVING LITTLE ENERGY: NEARLY EVERY DAY
SUM OF ALL RESPONSES TO PHQ QUESTIONS 1-9: 16
9. THOUGHTS THAT YOU WOULD BE BETTER OFF DEAD, OR OF HURTING YOURSELF: SEVERAL DAYS
8. MOVING OR SPEAKING SO SLOWLY THAT OTHER PEOPLE COULD HAVE NOTICED. OR THE OPPOSITE, BEING SO FIGETY OR RESTLESS THAT YOU HAVE BEEN MOVING AROUND A LOT MORE THAN USUAL: NOT AT ALL

## 2024-07-16 ASSESSMENT — COLUMBIA-SUICIDE SEVERITY RATING SCALE - C-SSRS
5. HAVE YOU STARTED TO WORK OUT OR WORKED OUT THE DETAILS OF HOW TO KILL YOURSELF? DO YOU INTEND TO CARRY OUT THIS PLAN?: NO
2. HAVE YOU ACTUALLY HAD ANY THOUGHTS OF KILLING YOURSELF?: YES
1. WITHIN THE PAST MONTH, HAVE YOU WISHED YOU WERE DEAD OR WISHED YOU COULD GO TO SLEEP AND NOT WAKE UP?: YES
3. HAVE YOU BEEN THINKING ABOUT HOW YOU MIGHT KILL YOURSELF?: NO
6. HAVE YOU EVER DONE ANYTHING, STARTED TO DO ANYTHING, OR PREPARED TO DO ANYTHING TO END YOUR LIFE?: NO
4. HAVE YOU HAD THESE THOUGHTS AND HAD SOME INTENTION OF ACTING ON THEM?: NO

## 2024-07-16 ASSESSMENT — ENCOUNTER SYMPTOMS
RHINORRHEA: 0
SINUS PRESSURE: 0
BACK PAIN: 1
SHORTNESS OF BREATH: 0

## 2024-07-16 NOTE — PROGRESS NOTES
Smokeless tobacco: Never   Vaping Use    Vaping Use: Never used   Substance and Sexual Activity    Alcohol use: No     Alcohol/week: 0.0 standard drinks of alcohol     Comment: previous alcoholic    Drug use: Yes     Types: Marijuana (Weed)     Comment: edibles for pain    Sexual activity: Yes     Partners: Male     Comment: monogamous with      Social Determinants of Health     Financial Resource Strain: Low Risk  (11/28/2023)    Overall Financial Resource Strain (CARDIA)     Difficulty of Paying Living Expenses: Not hard at all   Transportation Needs: Unknown (11/28/2023)    PRAPARE - Transportation     Lack of Transportation (Non-Medical): No   Housing Stability: Unknown (11/28/2023)    Housing Stability Vital Sign     Unstable Housing in the Last Year: No       OBJECTIVE    BP 98/68   Pulse 65   Temp 98.1 °F (36.7 °C) (Temporal)   Resp 16   Ht 1.524 m (5')   Wt 68 kg (150 lb)   SpO2 98%   BMI 29.29 kg/m²     Wt Readings from Last 3 Encounters:   07/16/24 68 kg (150 lb)   06/07/24 64.4 kg (142 lb)   05/28/24 64.7 kg (142 lb 9.6 oz)       Physical Exam  Constitutional:       General: She is not in acute distress.     Appearance: Normal appearance.   HENT:      Head: Normocephalic and atraumatic.   Eyes:      Conjunctiva/sclera: Conjunctivae normal.   Neck:      Thyroid: No thyroid mass or thyromegaly.   Cardiovascular:      Rate and Rhythm: Normal rate and regular rhythm.      Heart sounds: Normal heart sounds.   Pulmonary:      Effort: Pulmonary effort is normal.      Breath sounds: Normal breath sounds.   Musculoskeletal:      Cervical back: Neck supple.      Right lower leg: No edema.      Left lower leg: No edema.   Lymphadenopathy:      Cervical: No cervical adenopathy.   Skin:     General: Skin is warm and dry.   Neurological:      General: No focal deficit present.      Mental Status: She is alert and oriented to person, place, and time.   Psychiatric:         Mood and Affect: Affect normal.

## 2024-07-23 ENCOUNTER — HOSPITAL ENCOUNTER (OUTPATIENT)
Dept: NEUROLOGY | Age: 49
Discharge: HOME | End: 2024-07-23
Payer: COMMERCIAL

## 2024-07-23 VITALS — BODY MASS INDEX: 29.45 KG/M2 | WEIGHT: 150 LBS | HEIGHT: 60 IN

## 2024-07-23 PROCEDURE — 95913 NRV CNDJ TEST 13/> STUDIES: CPT

## 2024-07-23 PROCEDURE — 95886 MUSC TEST DONE W/N TEST COMP: CPT

## 2024-07-23 NOTE — PROCEDURES
PROCEDURE NOTE  Date: 7/23/2024   Name: Valerie Pulido  YOB: 1975    Procedures:    [facility]  Electrodiagnostic Laboratory   [place]         Full Name: Ashok Padilla Gender: Female  MRN: [id number] YOB: 1975  Location:: [facility]      Visit Date: 7/23/2024 12:15  Age: 49 Years 0 Months Old  Examining Physician: Dr Sweeney  Referring Physician: Dr Sweeney  Technician: MISAEL Hopkins  Height: 5 feet 0 inch  Weight: 150 lbs  Notes: Bilateral hand numbness      Motor NCS      Nerve / Sites Lat. Amplitude Distance Velocity Temp.    ms mV cm m/s °C   R Median - APB      Wrist 4.32 7.4 8  32.3      Elbow 8.28 7.3 19 48 32.1   L Median - APB      Wrist 8.07 0.4 8  32      Elbow 16.20 0.5 18.5 23 32   R Ulnar - ADM      Wrist 2.14 11.8 8  32.3      B.Elbow 5.21 11.2 19 62 32      A.Elbow 6.82 10.5 10 62 32   L Ulnar - ADM      Wrist 2.08 11.9 8  32.1      B.Elbow 5.21 11.1 18.5 59 32      A.Elbow 7.19 10.7 10 51 32   L Ulnar - FDI      Wrist 3.39 5.1 8  32.1      B.Elbow 6.20 4.2 18.5 66 32      A.Elbow 7.76 4.2 10 64 32       Sensory NCS      Nerve / Sites Onset Lat Peak Lat PP Amp Distance Velocity Temp.    ms ms µV cm m/s °C   R Median - Digit II (Antidromic)      Mid Palm 1.41 2.29 19.2 7 50 32.1      Wrist 3.85 4.64 17.3 14 36 32   L Median - Digit II (Antidromic)      Mid Palm NR NR NR 7 NR 31.9      Wrist NR NR NR 14 NR 31.9   R Ulnar - Digit V (Antidromic)      Wrist 2.66 3.28 38.0 14 53 32.1   L Ulnar - Digit V (Antidromic)      Wrist 2.55 3.28 44.1 14 55 32   R Radial - Anatomical snuff box (Forearm)      Forearm 1.61 2.34 31.8 10 62 32.1   L Radial - Anatomical snuff box (Forearm)      Forearm 1.51 2.08 41.9 10 66 32   L Dorsal ulnar cutaneous - Hand dorsum (Forearm)      Forearm 1.88 2.24 5.0 8 43 32.1   R Ulnar - Orthodromic, (Dig V, Mid palm)      Mid palm 1.15 1.93 10.5 8 70 32.1   L Ulnar - Orthodromic, (Dig V, Mid palm)      Mid palm 1.09 1.82 6.1 8 73 32   R

## 2024-08-07 ENCOUNTER — TELEPHONE (OUTPATIENT)
Age: 49
End: 2024-08-07

## 2024-08-07 ENCOUNTER — TELEMEDICINE (OUTPATIENT)
Age: 49
End: 2024-08-07
Payer: COMMERCIAL

## 2024-08-07 DIAGNOSIS — I63.9 CEREBROVASCULAR ACCIDENT (CVA), UNSPECIFIED MECHANISM (HCC): ICD-10-CM

## 2024-08-07 DIAGNOSIS — G56.03 BILATERAL CARPAL TUNNEL SYNDROME: Primary | ICD-10-CM

## 2024-08-07 DIAGNOSIS — G43.E11 INTRACTABLE CHRONIC MIGRAINE WITH AURA WITH STATUS MIGRAINOSUS: ICD-10-CM

## 2024-08-07 DIAGNOSIS — I63.10 CEREBROVASCULAR ACCIDENT (CVA) DUE TO EMBOLISM OF PRECEREBRAL ARTERY (HCC): ICD-10-CM

## 2024-08-07 DIAGNOSIS — G89.4 CHRONIC PAIN SYNDROME: ICD-10-CM

## 2024-08-07 DIAGNOSIS — G56.03 BILATERAL CARPAL TUNNEL SYNDROME: ICD-10-CM

## 2024-08-07 DIAGNOSIS — G25.2 ACTION TREMOR: ICD-10-CM

## 2024-08-07 DIAGNOSIS — Q07.00 ARNOLD-CHIARI MALFORMATION (HCC): Primary | ICD-10-CM

## 2024-08-07 PROCEDURE — 99214 OFFICE O/P EST MOD 30 MIN: CPT | Performed by: PSYCHIATRY & NEUROLOGY

## 2024-08-07 RX ORDER — ASPIRIN 81 MG/1
81 TABLET ORAL DAILY
Qty: 90 TABLET | Refills: 1 | Status: SHIPPED | OUTPATIENT
Start: 2024-08-07

## 2024-08-07 RX ORDER — ERENUMAB-AOOE 140 MG/ML
140 INJECTION, SOLUTION SUBCUTANEOUS
Qty: 1 ML | Refills: 3 | Status: SHIPPED | OUTPATIENT
Start: 2024-08-07

## 2024-08-07 RX ORDER — UBROGEPANT 100 MG/1
100 TABLET ORAL PRN
Qty: 8 TABLET | Refills: 3 | Status: SHIPPED | OUTPATIENT
Start: 2024-08-07

## 2024-08-07 RX ORDER — RIZATRIPTAN BENZOATE 10 MG/1
10 TABLET ORAL PRN
Qty: 9 TABLET | Refills: 3 | Status: SHIPPED
Start: 2024-08-07 | End: 2024-08-07

## 2024-08-07 RX ORDER — PROPRANOLOL HYDROCHLORIDE 10 MG/1
10 TABLET ORAL 2 TIMES DAILY
Qty: 60 TABLET | Refills: 3 | Status: SHIPPED | OUTPATIENT
Start: 2024-08-07

## 2024-08-07 NOTE — PROGRESS NOTES
for Hot Flashes/Flushing, Negative for Abnormal Thirst, Negative for Change in Body Hair  Skin:   Negative for Lumps or Nodules, Negative for Breast Lumps, Negative for Rashes or Sores   Psych:  Negative for Anxiety/Depression       PHYSICAL EXAM  Constitutional: She  is oriented to person, place and time and well- developed, well nourished and in no distress.  HENT:    Head: Normocephalic and atraumatic.    Nose: Nose normal.   Mouth/Throat: no oropharyngeal exudate.    Eyes: eom full  Neck:  Normal range of motion.  Musculoskeletal: Normal range of motion.   Neurological:   Mental Status: Level of alertness: Normal: Awake, appropriate, attentive  Orientation: Normal: Oriented to person, place and time  Affect: Normal  Speech and Language: Normal: Speech fluent with intact naming, repetition, comprehension, reading and writing.  Cranial Nerve: II - XII normal.  Muscle strength was antigravity in sudhir ue's and le's   There was no dysmetria seen on the sudhir found on finger-nose-finger testing and heel to shin testing.   Rapid alternating movements were normal.   Normal gait.   Psychiatric: Memory, affect and judgment normal.        ASSESSMENT AND PLAN   1. Arnold-Chiari malformation (HCC)  Not significant  No surgery for now  Has no syrinx     2. Intractable chronic migraine with aura with status migrainosus  Better in intensity  Continue inderal 10 mg bid  Increase aimovig to 140 mg q 4 weeks  D/c maxalt with cva seen on mri  Change to ubrelvy prn     3. Bilateral carpal tunnel syndrome  Severe in left, mild in right  Needs cts surgery at least on left  Refer to Jinny Leal MD, Orthopaedics (hand and upper extremities), [place] ([facility])    4. Action tremor  Better on inderal 10 mg bid     5. Chronic pain syndrome  Seeing pain mgt  On lyrica 150 mg bid from pain mgt     6. Cerebrovascular accident (CVA), unspecified mechanism (HCC)  Incidental on mri  Quit smoking  Start asa  D/c maxalt, use

## 2024-08-07 NOTE — PATIENT INSTRUCTIONS
Nurse-   Prior auth for aimovig higher dose      Send a copy of emg report to hand surgeon in [place]

## 2024-08-14 ENCOUNTER — TELEPHONE (OUTPATIENT)
Dept: NON INVASIVE DIAGNOSTICS | Age: 49
End: 2024-08-14

## 2024-08-15 ENCOUNTER — OFFICE VISIT (OUTPATIENT)
Dept: PAIN MANAGEMENT | Age: 49
End: 2024-08-15
Payer: COMMERCIAL

## 2024-08-15 VITALS
HEART RATE: 79 BPM | BODY MASS INDEX: 29.43 KG/M2 | OXYGEN SATURATION: 98 % | SYSTOLIC BLOOD PRESSURE: 141 MMHG | RESPIRATION RATE: 18 BRPM | WEIGHT: 149.91 LBS | HEIGHT: 60 IN | DIASTOLIC BLOOD PRESSURE: 82 MMHG | TEMPERATURE: 98.2 F

## 2024-08-15 DIAGNOSIS — M79.7 FIBROMYALGIA: ICD-10-CM

## 2024-08-15 DIAGNOSIS — G89.4 CHRONIC PAIN SYNDROME: Primary | ICD-10-CM

## 2024-08-15 DIAGNOSIS — M54.2 CERVICALGIA: ICD-10-CM

## 2024-08-15 DIAGNOSIS — M79.10 MYALGIA: ICD-10-CM

## 2024-08-15 PROCEDURE — 99213 OFFICE O/P EST LOW 20 MIN: CPT

## 2024-08-15 PROCEDURE — 99213 OFFICE O/P EST LOW 20 MIN: CPT | Performed by: PAIN MEDICINE

## 2024-08-15 PROCEDURE — 3077F SYST BP >= 140 MM HG: CPT | Performed by: PAIN MEDICINE

## 2024-08-15 PROCEDURE — 3079F DIAST BP 80-89 MM HG: CPT | Performed by: PAIN MEDICINE

## 2024-08-15 RX ORDER — PREGABALIN 150 MG/1
150 CAPSULE ORAL 3 TIMES DAILY
Qty: 90 CAPSULE | Refills: 2 | Status: SHIPPED | OUTPATIENT
Start: 2024-08-15 | End: 2024-11-13

## 2024-08-15 NOTE — PROGRESS NOTES
Valerie Pulido presents to the [facility] on 8/15/2024. Valerie is complaining of pain in her arms and shoulders. The pain is constant. The pain is described as aching and throbbing. Pain is rated on her best day at a 9, on her worst day at a 10, and on average at a 9 on the VAS scale. She took her last dose of Lyrica and Cymbalta she is out of medicine.      Any procedures since your last visit: No    She is  on NSAIDS and  is  on anticoagulation medications to include ASA and is managed by Shahram Bhagat DO  .     Pacemaker or defibrillator: No    Medication Contract and Consent for Opioid Use Documents Filed       Patient Documents       Type of Document Status Date Received Received By Description    Medication Contract [Status Missing]  MICHELE DAVIS 4/21/14 Neurosurgery Medication Contract                       Resp 18   Ht 1.524 m (5')   Wt 68 kg (149 lb 14.6 oz)   BMI 29.28 kg/m²      No LMP recorded. Patient has had a hysterectomy.  
edibles for pain    Sexual activity: Yes     Partners: Male     Comment: monogamous with    Other Topics Concern    Not on file   Social History Narrative    Not on file     Social Determinants of Health     Financial Resource Strain: Low Risk  (2023)    Overall Financial Resource Strain (CARDIA)     Difficulty of Paying Living Expenses: Not hard at all   Food Insecurity: Not on file (2023)   Transportation Needs: Unknown (2023)    PRAPARE - Transportation     Lack of Transportation (Medical): Not on file     Lack of Transportation (Non-Medical): No   Physical Activity: Not on file   Stress: Not on file   Social Connections: Not on file   Intimate Partner Violence: Not on file   Housing Stability: Unknown (2023)    Housing Stability Vital Sign     Unable to Pay for Housing in the Last Year: Not on file     Number of Places Lived in the Last Year: Not on file     Unstable Housing in the Last Year: No       Family History   Problem Relation Age of Onset    Diabetes Mother     Heart Attack Mother 55    Heart Attack Father         young    Cancer Maternal Aunt         breast, uterine, cervical    Heart Attack Maternal Uncle         3     Heart Disease Other        REVIEW OF SYSTEMS:     Valerie denies fever/chills, chest pain, shortness of breath, new bowel or bladder complaints. All other review of systems was negative.    PHYSICAL EXAMINATION:      BP (!) 141/82   Pulse 79   Temp 98.2 °F (36.8 °C) (Temporal)   Resp 18   Ht 1.524 m (5')   Wt 68 kg (149 lb 14.6 oz)   SpO2 98%   BMI 29.28 kg/m²     General:       General appearance:pleasant and well-hydrated, in moderate distress and A & O x3  Build:Normal Weight  Function:Rises from a seated position with difficulty     HEENT:     Head:normocephalic, atraumatic  Pupils:regular, round, equal  Sclera: icterus absent     Lungs:     Breathing:normal breathing pattern     Abdomen:     Shape:non-distended and

## 2024-08-21 ENCOUNTER — HOSPITAL ENCOUNTER (OUTPATIENT)
Dept: ULTRASOUND IMAGING | Age: 49
Discharge: HOME | End: 2024-08-23
Attending: PSYCHIATRY & NEUROLOGY
Payer: COMMERCIAL

## 2024-08-21 DIAGNOSIS — I63.10 CEREBROVASCULAR ACCIDENT (CVA) DUE TO EMBOLISM OF PRECEREBRAL ARTERY (HCC): ICD-10-CM

## 2024-08-21 DIAGNOSIS — I63.9 CEREBROVASCULAR ACCIDENT (CVA), UNSPECIFIED MECHANISM (HCC): ICD-10-CM

## 2024-08-21 PROCEDURE — 93880 EXTRACRANIAL BILAT STUDY: CPT

## 2024-08-28 ENCOUNTER — HOSPITAL ENCOUNTER (INPATIENT)
Age: 49
LOS: 3 days | Discharge: HOME | DRG: 045 | End: 2024-08-31
Attending: EMERGENCY MEDICINE | Admitting: INTERNAL MEDICINE
Payer: COMMERCIAL

## 2024-08-28 ENCOUNTER — APPOINTMENT (OUTPATIENT)
Dept: CT IMAGING | Age: 49
DRG: 045 | End: 2024-08-28
Payer: COMMERCIAL

## 2024-08-28 DIAGNOSIS — I10 ESSENTIAL HYPERTENSION: Chronic | ICD-10-CM

## 2024-08-28 DIAGNOSIS — I63.9 CEREBROVASCULAR ACCIDENT (CVA), UNSPECIFIED MECHANISM (HCC): Primary | ICD-10-CM

## 2024-08-28 LAB
ALBUMIN SERPL-MCNC: 4 G/DL (ref 3.5–5.2)
ALP SERPL-CCNC: 97 U/L (ref 35–104)
ALT SERPL-CCNC: 14 U/L (ref 0–32)
ANION GAP SERPL CALCULATED.3IONS-SCNC: 9 MMOL/L (ref 7–16)
AST SERPL-CCNC: 25 U/L (ref 0–31)
BASOPHILS # BLD: 0.08 K/UL (ref 0–0.2)
BASOPHILS NFR BLD: 1 % (ref 0–2)
BILIRUB SERPL-MCNC: <0.2 MG/DL (ref 0–1.2)
BUN SERPL-MCNC: 10 MG/DL (ref 6–20)
CALCIUM SERPL-MCNC: 10.2 MG/DL (ref 8.6–10.2)
CHLORIDE SERPL-SCNC: 110 MMOL/L (ref 98–107)
CO2 SERPL-SCNC: 26 MMOL/L (ref 22–29)
CREAT SERPL-MCNC: 0.7 MG/DL (ref 0.5–1)
EOSINOPHIL # BLD: 0.25 K/UL (ref 0.05–0.5)
EOSINOPHILS RELATIVE PERCENT: 3 % (ref 0–6)
ERYTHROCYTE [DISTWIDTH] IN BLOOD BY AUTOMATED COUNT: 16.3 % (ref 11.5–15)
GFR, ESTIMATED: >90 ML/MIN/1.73M2
GLUCOSE BLD-MCNC: 135 MG/DL (ref 74–99)
GLUCOSE SERPL-MCNC: 122 MG/DL (ref 74–99)
HCT VFR BLD AUTO: 26.9 % (ref 34–48)
HGB BLD-MCNC: 8.1 G/DL (ref 11.5–15.5)
IMM GRANULOCYTES # BLD AUTO: <0.03 K/UL (ref 0–0.58)
IMM GRANULOCYTES NFR BLD: 0 % (ref 0–5)
INR PPP: 0.9
LYMPHOCYTES NFR BLD: 2.66 K/UL (ref 1.5–4)
LYMPHOCYTES RELATIVE PERCENT: 34 % (ref 20–42)
MCH RBC QN AUTO: 26.6 PG (ref 26–35)
MCHC RBC AUTO-ENTMCNC: 30.1 G/DL (ref 32–34.5)
MCV RBC AUTO: 88.2 FL (ref 80–99.9)
MONOCYTES NFR BLD: 0.55 K/UL (ref 0.1–0.95)
MONOCYTES NFR BLD: 7 % (ref 2–12)
NEUTROPHILS NFR BLD: 55 % (ref 43–80)
NEUTS SEG NFR BLD: 4.36 K/UL (ref 1.8–7.3)
PARTIAL THROMBOPLASTIN TIME: 26.3 SEC (ref 24.5–35.1)
PLATELET # BLD AUTO: 309 K/UL (ref 130–450)
PMV BLD AUTO: 10.5 FL (ref 7–12)
POTASSIUM SERPL-SCNC: 4.3 MMOL/L (ref 3.5–5)
PROT SERPL-MCNC: 6.6 G/DL (ref 6.4–8.3)
PROTHROMBIN TIME: 9.2 SEC (ref 9.3–12.4)
RBC # BLD AUTO: 3.05 M/UL (ref 3.5–5.5)
SODIUM SERPL-SCNC: 145 MMOL/L (ref 132–146)
TROPONIN I SERPL HS-MCNC: <6 NG/L (ref 0–9)
WBC OTHER # BLD: 7.9 K/UL (ref 4.5–11.5)

## 2024-08-28 PROCEDURE — 70498 CT ANGIOGRAPHY NECK: CPT

## 2024-08-28 PROCEDURE — 2580000003 HC RX 258

## 2024-08-28 PROCEDURE — 6360000002 HC RX W HCPCS: Performed by: EMERGENCY MEDICINE

## 2024-08-28 PROCEDURE — 80053 COMPREHEN METABOLIC PANEL: CPT

## 2024-08-28 PROCEDURE — 99285 EMERGENCY DEPT VISIT HI MDM: CPT

## 2024-08-28 PROCEDURE — 85730 THROMBOPLASTIN TIME PARTIAL: CPT

## 2024-08-28 PROCEDURE — 85610 PROTHROMBIN TIME: CPT

## 2024-08-28 PROCEDURE — 3E03317 INTRODUCTION OF OTHER THROMBOLYTIC INTO PERIPHERAL VEIN, PERCUTANEOUS APPROACH: ICD-10-PCS | Performed by: INTERNAL MEDICINE

## 2024-08-28 PROCEDURE — 6360000004 HC RX CONTRAST MEDICATION: Performed by: RADIOLOGY

## 2024-08-28 PROCEDURE — 99449 NTRPROF PH1/NTRNET/EHR 31/>: CPT | Performed by: PSYCHIATRY & NEUROLOGY

## 2024-08-28 PROCEDURE — 96374 THER/PROPH/DIAG INJ IV PUSH: CPT

## 2024-08-28 PROCEDURE — 93005 ELECTROCARDIOGRAM TRACING: CPT | Performed by: EMERGENCY MEDICINE

## 2024-08-28 PROCEDURE — 70496 CT ANGIOGRAPHY HEAD: CPT

## 2024-08-28 PROCEDURE — 99223 1ST HOSP IP/OBS HIGH 75: CPT | Performed by: INTERNAL MEDICINE

## 2024-08-28 PROCEDURE — 84484 ASSAY OF TROPONIN QUANT: CPT

## 2024-08-28 PROCEDURE — 2000000000 HC ICU R&B

## 2024-08-28 PROCEDURE — 70450 CT HEAD/BRAIN W/O DYE: CPT

## 2024-08-28 PROCEDURE — 2580000003 HC RX 258: Performed by: EMERGENCY MEDICINE

## 2024-08-28 PROCEDURE — 82962 GLUCOSE BLOOD TEST: CPT

## 2024-08-28 PROCEDURE — 85025 COMPLETE CBC W/AUTO DIFF WBC: CPT

## 2024-08-28 RX ORDER — PREGABALIN 75 MG/1
150 CAPSULE ORAL 3 TIMES DAILY
Status: DISCONTINUED | OUTPATIENT
Start: 2024-08-28 | End: 2024-08-31 | Stop reason: HOSPADM

## 2024-08-28 RX ORDER — ASPIRIN 300 MG/1
300 SUPPOSITORY RECTAL DAILY
Status: DISCONTINUED | OUTPATIENT
Start: 2024-08-29 | End: 2024-08-29

## 2024-08-28 RX ORDER — SODIUM CHLORIDE 0.9 % (FLUSH) 0.9 %
10 SYRINGE (ML) INJECTION ONCE
Status: COMPLETED | OUTPATIENT
Start: 2024-08-28 | End: 2024-08-28

## 2024-08-28 RX ORDER — POLYETHYLENE GLYCOL 3350 17 G/17G
17 POWDER, FOR SOLUTION ORAL DAILY PRN
Status: DISCONTINUED | OUTPATIENT
Start: 2024-08-28 | End: 2024-08-31 | Stop reason: HOSPADM

## 2024-08-28 RX ORDER — PROPRANOLOL HCL 10 MG
10 TABLET ORAL 2 TIMES DAILY
Status: DISCONTINUED | OUTPATIENT
Start: 2024-08-28 | End: 2024-08-31

## 2024-08-28 RX ORDER — AMLODIPINE BESYLATE 10 MG/1
10 TABLET ORAL DAILY
Status: DISCONTINUED | OUTPATIENT
Start: 2024-08-29 | End: 2024-08-31 | Stop reason: HOSPADM

## 2024-08-28 RX ORDER — ATORVASTATIN CALCIUM 80 MG/1
80 TABLET, FILM COATED ORAL NIGHTLY
Status: DISCONTINUED | OUTPATIENT
Start: 2024-08-28 | End: 2024-08-29

## 2024-08-28 RX ORDER — SODIUM CHLORIDE 0.9 % (FLUSH) 0.9 %
5-40 SYRINGE (ML) INJECTION EVERY 12 HOURS SCHEDULED
Status: DISCONTINUED | OUTPATIENT
Start: 2024-08-28 | End: 2024-08-29 | Stop reason: SDUPTHER

## 2024-08-28 RX ORDER — LABETALOL HYDROCHLORIDE 5 MG/ML
10 INJECTION, SOLUTION INTRAVENOUS EVERY 30 MIN PRN
Status: DISCONTINUED | OUTPATIENT
Start: 2024-08-28 | End: 2024-08-31 | Stop reason: HOSPADM

## 2024-08-28 RX ORDER — METHOCARBAMOL 500 MG/1
500 TABLET, FILM COATED ORAL 3 TIMES DAILY
Status: DISCONTINUED | OUTPATIENT
Start: 2024-08-28 | End: 2024-08-31 | Stop reason: HOSPADM

## 2024-08-28 RX ORDER — SODIUM CHLORIDE 9 MG/ML
INJECTION, SOLUTION INTRAVENOUS PRN
Status: DISCONTINUED | OUTPATIENT
Start: 2024-08-28 | End: 2024-08-31 | Stop reason: HOSPADM

## 2024-08-28 RX ORDER — IOPAMIDOL 755 MG/ML
75 INJECTION, SOLUTION INTRAVASCULAR
Status: COMPLETED | OUTPATIENT
Start: 2024-08-28 | End: 2024-08-28

## 2024-08-28 RX ORDER — ASPIRIN 81 MG/1
81 TABLET, CHEWABLE ORAL DAILY
Status: DISCONTINUED | OUTPATIENT
Start: 2024-08-29 | End: 2024-08-29

## 2024-08-28 RX ORDER — ONDANSETRON 2 MG/ML
4 INJECTION INTRAMUSCULAR; INTRAVENOUS EVERY 6 HOURS PRN
Status: DISCONTINUED | OUTPATIENT
Start: 2024-08-28 | End: 2024-08-31 | Stop reason: HOSPADM

## 2024-08-28 RX ORDER — SODIUM CHLORIDE 0.9 % (FLUSH) 0.9 %
5-40 SYRINGE (ML) INJECTION PRN
Status: DISCONTINUED | OUTPATIENT
Start: 2024-08-28 | End: 2024-08-31 | Stop reason: HOSPADM

## 2024-08-28 RX ORDER — DULOXETIN HYDROCHLORIDE 60 MG/1
60 CAPSULE, DELAYED RELEASE ORAL DAILY
Status: DISCONTINUED | OUTPATIENT
Start: 2024-08-29 | End: 2024-08-31 | Stop reason: HOSPADM

## 2024-08-28 RX ORDER — ENOXAPARIN SODIUM 100 MG/ML
40 INJECTION SUBCUTANEOUS DAILY
Status: DISCONTINUED | OUTPATIENT
Start: 2024-08-29 | End: 2024-08-31 | Stop reason: HOSPADM

## 2024-08-28 RX ORDER — ONDANSETRON 4 MG/1
4 TABLET, ORALLY DISINTEGRATING ORAL EVERY 8 HOURS PRN
Status: DISCONTINUED | OUTPATIENT
Start: 2024-08-28 | End: 2024-08-31 | Stop reason: HOSPADM

## 2024-08-28 RX ORDER — SODIUM CHLORIDE 0.9 % (FLUSH) 0.9 %
5-40 SYRINGE (ML) INJECTION EVERY 12 HOURS SCHEDULED
Status: DISCONTINUED | OUTPATIENT
Start: 2024-08-28 | End: 2024-08-31 | Stop reason: HOSPADM

## 2024-08-28 RX ORDER — ASPIRIN 81 MG/1
81 TABLET ORAL DAILY
Status: DISCONTINUED | OUTPATIENT
Start: 2024-08-29 | End: 2024-08-29

## 2024-08-28 RX ORDER — SODIUM CHLORIDE 9 MG/ML
INJECTION, SOLUTION INTRAVENOUS CONTINUOUS
Status: DISCONTINUED | OUTPATIENT
Start: 2024-08-28 | End: 2024-08-29

## 2024-08-28 RX ORDER — HYDRALAZINE HYDROCHLORIDE 20 MG/ML
10 INJECTION INTRAMUSCULAR; INTRAVENOUS EVERY 30 MIN PRN
Status: DISCONTINUED | OUTPATIENT
Start: 2024-08-28 | End: 2024-08-31 | Stop reason: HOSPADM

## 2024-08-28 RX ADMIN — SODIUM CHLORIDE, PRESERVATIVE FREE 10 ML: 5 INJECTION INTRAVENOUS at 19:57

## 2024-08-28 RX ADMIN — SODIUM CHLORIDE: 9 INJECTION, SOLUTION INTRAVENOUS at 22:08

## 2024-08-28 RX ADMIN — Medication 18 MG: at 19:57

## 2024-08-28 RX ADMIN — IOPAMIDOL 75 ML: 755 INJECTION, SOLUTION INTRAVENOUS at 19:39

## 2024-08-28 ASSESSMENT — PAIN DESCRIPTION - LOCATION: LOCATION: GENERALIZED

## 2024-08-28 ASSESSMENT — PAIN SCALES - GENERAL: PAINLEVEL_OUTOF10: 8

## 2024-08-28 NOTE — PROGRESS NOTES
ER Clinical Pharmacy Note:    48 yo female presents to the ER @ 19:26 for stroke symptoms.    Last known well = 17:53  Arrival time = 19:26  POC glucose: 130mg/dL  Weight = 70.7 kg  Blood pressure = 148/79 mmHg  Dose: 18mg IV bolus @ 19:57  Door to needle time = 31 minutes    Patient and/or family was counseled on the risks/benefits of giving TNK.  They agree and wish to proceed.    Marcella Garcia, PharmD, BCIDP, BCPS 8/28/2024 7:59 PM  [phone]

## 2024-08-28 NOTE — ED NOTES
Stroke/ Jasmin Alert Time:19:30      Time Neurologist called:19:31 (Rocco)  :    Time CT Notified: 19:30      BRAIN alert time: (If applicable)

## 2024-08-29 ENCOUNTER — APPOINTMENT (OUTPATIENT)
Dept: MRI IMAGING | Age: 49
DRG: 045 | End: 2024-08-29
Payer: COMMERCIAL

## 2024-08-29 ENCOUNTER — TELEPHONE (OUTPATIENT)
Dept: FAMILY MEDICINE CLINIC | Age: 49
End: 2024-08-29

## 2024-08-29 PROBLEM — R47.01 APHASIA DETERMINED BY EXAMINATION: Status: ACTIVE | Noted: 2024-08-29

## 2024-08-29 PROBLEM — I63.9 CEREBROVASCULAR ACCIDENT (CVA) (HCC): Status: ACTIVE | Noted: 2024-08-29

## 2024-08-29 PROBLEM — R29.90 STROKE-LIKE EPISODE: Status: ACTIVE | Noted: 2024-08-29

## 2024-08-29 LAB
ALBUMIN SERPL-MCNC: 3.6 G/DL (ref 3.5–5.2)
ALP SERPL-CCNC: 69 U/L (ref 35–104)
ALT SERPL-CCNC: 15 U/L (ref 0–32)
ANION GAP SERPL CALCULATED.3IONS-SCNC: 10 MMOL/L (ref 7–16)
AST SERPL-CCNC: 33 U/L (ref 0–31)
BILIRUB SERPL-MCNC: <0.2 MG/DL (ref 0–1.2)
BUN SERPL-MCNC: 9 MG/DL (ref 6–20)
CALCIUM SERPL-MCNC: 9.9 MG/DL (ref 8.6–10.2)
CHLORIDE SERPL-SCNC: 113 MMOL/L (ref 98–107)
CHOLEST SERPL-MCNC: 168 MG/DL
CO2 SERPL-SCNC: 23 MMOL/L (ref 22–29)
CREAT SERPL-MCNC: 0.6 MG/DL (ref 0.5–1)
EKG ATRIAL RATE: 67 BPM
EKG P AXIS: 52 DEGREES
EKG P-R INTERVAL: 178 MS
EKG Q-T INTERVAL: 412 MS
EKG QRS DURATION: 142 MS
EKG QTC CALCULATION (BAZETT): 435 MS
EKG R AXIS: 36 DEGREES
EKG T AXIS: 24 DEGREES
EKG VENTRICULAR RATE: 67 BPM
ERYTHROCYTE [DISTWIDTH] IN BLOOD BY AUTOMATED COUNT: 16.3 % (ref 11.5–15)
FERRITIN SERPL-MCNC: 5 NG/ML
GFR, ESTIMATED: >90 ML/MIN/1.73M2
GLUCOSE SERPL-MCNC: 84 MG/DL (ref 74–99)
HBA1C MFR BLD: 5.6 % (ref 4–5.6)
HCT VFR BLD AUTO: 25.4 % (ref 34–48)
HDLC SERPL-MCNC: 47 MG/DL
HGB BLD-MCNC: 7.2 G/DL (ref 11.5–15.5)
IRON SATN MFR SERPL: 6 % (ref 15–50)
IRON SERPL-MCNC: 24 UG/DL (ref 37–145)
LDLC SERPL CALC-MCNC: 91 MG/DL
MCH RBC QN AUTO: 25.3 PG (ref 26–35)
MCHC RBC AUTO-ENTMCNC: 28.3 G/DL (ref 32–34.5)
MCV RBC AUTO: 89.1 FL (ref 80–99.9)
PLATELET # BLD AUTO: 265 K/UL (ref 130–450)
PMV BLD AUTO: 11.2 FL (ref 7–12)
POTASSIUM SERPL-SCNC: 4.2 MMOL/L (ref 3.5–5)
PROT SERPL-MCNC: 5.9 G/DL (ref 6.4–8.3)
RBC # BLD AUTO: 2.85 M/UL (ref 3.5–5.5)
SODIUM SERPL-SCNC: 146 MMOL/L (ref 132–146)
TIBC SERPL-MCNC: 414 UG/DL (ref 250–450)
TRIGL SERPL-MCNC: 151 MG/DL
VLDLC SERPL CALC-MCNC: 30 MG/DL
WBC OTHER # BLD: 6.2 K/UL (ref 4.5–11.5)

## 2024-08-29 PROCEDURE — 2580000003 HC RX 258: Performed by: INTERNAL MEDICINE

## 2024-08-29 PROCEDURE — 97530 THERAPEUTIC ACTIVITIES: CPT

## 2024-08-29 PROCEDURE — 99232 SBSQ HOSP IP/OBS MODERATE 35: CPT | Performed by: INTERNAL MEDICINE

## 2024-08-29 PROCEDURE — 80053 COMPREHEN METABOLIC PANEL: CPT

## 2024-08-29 PROCEDURE — 6370000000 HC RX 637 (ALT 250 FOR IP): Performed by: INTERNAL MEDICINE

## 2024-08-29 PROCEDURE — 83036 HEMOGLOBIN GLYCOSYLATED A1C: CPT

## 2024-08-29 PROCEDURE — 85027 COMPLETE CBC AUTOMATED: CPT

## 2024-08-29 PROCEDURE — 70551 MRI BRAIN STEM W/O DYE: CPT

## 2024-08-29 PROCEDURE — 2580000003 HC RX 258: Performed by: EMERGENCY MEDICINE

## 2024-08-29 PROCEDURE — 82728 ASSAY OF FERRITIN: CPT

## 2024-08-29 PROCEDURE — 99223 1ST HOSP IP/OBS HIGH 75: CPT | Performed by: STUDENT IN AN ORGANIZED HEALTH CARE EDUCATION/TRAINING PROGRAM

## 2024-08-29 PROCEDURE — 83550 IRON BINDING TEST: CPT

## 2024-08-29 PROCEDURE — 92610 EVALUATE SWALLOWING FUNCTION: CPT

## 2024-08-29 PROCEDURE — 6360000002 HC RX W HCPCS: Performed by: INTERNAL MEDICINE

## 2024-08-29 PROCEDURE — 92523 SPEECH SOUND LANG COMPREHEN: CPT

## 2024-08-29 PROCEDURE — 97166 OT EVAL MOD COMPLEX 45 MIN: CPT

## 2024-08-29 PROCEDURE — 6370000000 HC RX 637 (ALT 250 FOR IP): Performed by: STUDENT IN AN ORGANIZED HEALTH CARE EDUCATION/TRAINING PROGRAM

## 2024-08-29 PROCEDURE — 99291 CRITICAL CARE FIRST HOUR: CPT | Performed by: NURSE PRACTITIONER

## 2024-08-29 PROCEDURE — 97162 PT EVAL MOD COMPLEX 30 MIN: CPT

## 2024-08-29 PROCEDURE — 2000000000 HC ICU R&B

## 2024-08-29 PROCEDURE — 87081 CULTURE SCREEN ONLY: CPT

## 2024-08-29 PROCEDURE — 93010 ELECTROCARDIOGRAM REPORT: CPT | Performed by: INTERNAL MEDICINE

## 2024-08-29 PROCEDURE — 83540 ASSAY OF IRON: CPT

## 2024-08-29 PROCEDURE — 80061 LIPID PANEL: CPT

## 2024-08-29 PROCEDURE — 6360000002 HC RX W HCPCS: Performed by: STUDENT IN AN ORGANIZED HEALTH CARE EDUCATION/TRAINING PROGRAM

## 2024-08-29 RX ORDER — ATORVASTATIN CALCIUM 20 MG/1
20 TABLET, FILM COATED ORAL NIGHTLY
Status: DISCONTINUED | OUTPATIENT
Start: 2024-08-29 | End: 2024-08-31 | Stop reason: HOSPADM

## 2024-08-29 RX ORDER — LORAZEPAM 2 MG/ML
0.5 INJECTION INTRAMUSCULAR ONCE
Status: COMPLETED | OUTPATIENT
Start: 2024-08-29 | End: 2024-08-29

## 2024-08-29 RX ORDER — ACETAMINOPHEN 325 MG/1
650 TABLET ORAL EVERY 4 HOURS PRN
Status: DISCONTINUED | OUTPATIENT
Start: 2024-08-29 | End: 2024-08-31 | Stop reason: HOSPADM

## 2024-08-29 RX ADMIN — SODIUM CHLORIDE, PRESERVATIVE FREE 10 ML: 5 INJECTION INTRAVENOUS at 00:07

## 2024-08-29 RX ADMIN — METHOCARBAMOL TABLETS 500 MG: 500 TABLET, COATED ORAL at 14:34

## 2024-08-29 RX ADMIN — LORAZEPAM 0.5 MG: 2 INJECTION INTRAMUSCULAR; INTRAVENOUS at 16:26

## 2024-08-29 RX ADMIN — PREGABALIN 150 MG: 75 CAPSULE ORAL at 20:48

## 2024-08-29 RX ADMIN — SODIUM CHLORIDE, PRESERVATIVE FREE 20 ML: 5 INJECTION INTRAVENOUS at 20:49

## 2024-08-29 RX ADMIN — METHOCARBAMOL TABLETS 500 MG: 500 TABLET, COATED ORAL at 08:49

## 2024-08-29 RX ADMIN — SODIUM CHLORIDE: 9 INJECTION, SOLUTION INTRAVENOUS at 07:48

## 2024-08-29 RX ADMIN — ATORVASTATIN CALCIUM 80 MG: 80 TABLET, FILM COATED ORAL at 00:07

## 2024-08-29 RX ADMIN — PREGABALIN 150 MG: 75 CAPSULE ORAL at 14:34

## 2024-08-29 RX ADMIN — METHOCARBAMOL TABLETS 500 MG: 500 TABLET, COATED ORAL at 20:49

## 2024-08-29 RX ADMIN — DULOXETINE HYDROCHLORIDE 60 MG: 60 CAPSULE, DELAYED RELEASE ORAL at 08:50

## 2024-08-29 RX ADMIN — ATORVASTATIN CALCIUM 20 MG: 20 TABLET, FILM COATED ORAL at 20:49

## 2024-08-29 RX ADMIN — SODIUM CHLORIDE, PRESERVATIVE FREE 10 ML: 5 INJECTION INTRAVENOUS at 08:41

## 2024-08-29 RX ADMIN — METHOCARBAMOL TABLETS 500 MG: 500 TABLET, COATED ORAL at 00:07

## 2024-08-29 RX ADMIN — PROPRANOLOL HYDROCHLORIDE 10 MG: 10 TABLET ORAL at 08:50

## 2024-08-29 RX ADMIN — PROPRANOLOL HYDROCHLORIDE 10 MG: 10 TABLET ORAL at 20:48

## 2024-08-29 RX ADMIN — PREGABALIN 150 MG: 75 CAPSULE ORAL at 00:07

## 2024-08-29 RX ADMIN — PREGABALIN 150 MG: 75 CAPSULE ORAL at 08:49

## 2024-08-29 RX ADMIN — SODIUM CHLORIDE, PRESERVATIVE FREE 10 ML: 5 INJECTION INTRAVENOUS at 16:25

## 2024-08-29 RX ADMIN — PROPRANOLOL HYDROCHLORIDE 10 MG: 10 TABLET ORAL at 00:12

## 2024-08-29 RX ADMIN — ENOXAPARIN SODIUM 40 MG: 100 INJECTION SUBCUTANEOUS at 21:53

## 2024-08-29 RX ADMIN — ACETAMINOPHEN 650 MG: 325 TABLET ORAL at 11:24

## 2024-08-29 ASSESSMENT — PAIN SCALES - GENERAL
PAINLEVEL_OUTOF10: 0
PAINLEVEL_OUTOF10: 4
PAINLEVEL_OUTOF10: 0
PAINLEVEL_OUTOF10: 4
PAINLEVEL_OUTOF10: 0
PAINLEVEL_OUTOF10: 0
PAINLEVEL_OUTOF10: 4
PAINLEVEL_OUTOF10: 0
PAINLEVEL_OUTOF10: 0
PAINLEVEL_OUTOF10: 4
PAINLEVEL_OUTOF10: 0

## 2024-08-29 ASSESSMENT — PAIN DESCRIPTION - FREQUENCY: FREQUENCY: CONTINUOUS

## 2024-08-29 ASSESSMENT — PAIN DESCRIPTION - DESCRIPTORS: DESCRIPTORS: ACHING;DISCOMFORT;PRESSURE

## 2024-08-29 ASSESSMENT — PAIN DESCRIPTION - PAIN TYPE: TYPE: ACUTE PAIN

## 2024-08-29 ASSESSMENT — PAIN DESCRIPTION - ORIENTATION: ORIENTATION: RIGHT;LEFT;ANTERIOR;MID;POSTERIOR

## 2024-08-29 ASSESSMENT — PAIN DESCRIPTION - ONSET: ONSET: ON-GOING

## 2024-08-29 ASSESSMENT — PAIN - FUNCTIONAL ASSESSMENT: PAIN_FUNCTIONAL_ASSESSMENT: ACTIVITIES ARE NOT PREVENTED

## 2024-08-29 ASSESSMENT — PAIN DESCRIPTION - LOCATION: LOCATION: HAND

## 2024-08-29 NOTE — PLAN OF CARE
Unfortunately patient's MRI was already completed by the time I could make changes to the MRI order.  Regardless, I have personally reviewed the MRI brain and there is no evidence of acute stroke on DWI.  On T2/FLAIR sequences, there is stable signal change (actually slightly less prominent) in bilateral posterior parietal lobe as compared to the MRI done in May 2024.  The clinical significance of the signal change is not clear to me at this time.    No need for 24-hour head CT.  I will cancel that order.  Okay for baby aspirin and DVT prophylaxis with Lovenox once 24 hours from TNK are over.  Will continue to follow.     No (0)

## 2024-08-29 NOTE — PROGRESS NOTES
Neuro Science Intensive Care Unit  Critical Care Consult 2024      Date of Admission:     CC: Aphasia    HPI: 49 year old female with a PMH of migraines, Chiari 1 malformation, seizure, HTN, fibromyalgia presented to ED with aphasia and whole body weakness.  She received TNK and had complete resolution of symptoms.      Problem List:   Patient Active Problem List   Diagnosis    Chronic pain syndrome    Low back pain radiating to left leg    DDD (degenerative disc disease)    Spondylolisthesis of lumbar region    Protruded lumbar disc    DDD (degenerative disc disease), lumbar    Spondylolisthesis, grade 1    DDD (degenerative disc disease), thoracic    Facet syndrome, lumbar    Low back pain    Osteoarthritis of left hip    Anxiety and depression    Lumbar radiculopathy    Back pain    Vitamin D deficiency    Cigarette nicotine dependence without complication    Dyslipidemia    Essential hypertension    Cervicalgia    Myalgia    Fibromyalgia    Hyperparathyroidism (HCC)    Chiari I malformation (HCC)    Nasal congestion    Acute cerebrovascular accident (CVA) (HCC)    Stroke-like episode    Cerebrovascular accident (CVA) (HCC)       Surgical/Interventional Procedures: : TNK 1930    PMH:    has a past medical history of Acute cerebrovascular accident (CVA) (HCC), Chest pain, Chronic back pain, Chronic neck pain, Depression, DJD (degenerative joint disease) of lumbar spine, Dyslipidemia, Essential hypertension, 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    PSH:    has a past surgical history that includes Gastric bypass surgery ();  section (); Partial hysterectomy; Tubal ligation (); ECHO Compl W Dop Color Flow (2012); Knee arthroscopy (Right, ); Nerve Block (13); Nerve Block; Nerve Block (2013); Tonsillectomy  status:      Spouse name: Not on file    Number of children: Not on file    Years of education: Not on file    Highest education level: Not on file   Occupational History    Not on file   Tobacco Use    Smoking status: Every Day     Current packs/day: 1.00     Average packs/day: 1 pack/day for 11.7 years (11.7 ttl pk-yrs)     Types: Cigarettes     Start date:     Smokeless tobacco: Never   Vaping Use    Vaping status: Never Used   Substance and Sexual Activity    Alcohol use: No     Alcohol/week: 0.0 standard drinks of alcohol     Comment: previous alcoholic    Drug use: Yes     Types: Marijuana (Weed)     Comment: edibles for pain    Sexual activity: Yes     Partners: Male     Comment: monogamous with    Other Topics Concern    Not on file   Social History Narrative    Not on file     Social Determinants of Health     Financial Resource Strain: Low Risk  (2023)    Overall Financial Resource Strain (CARDIA)     Difficulty of Paying Living Expenses: Not hard at all   Food Insecurity: Not on file (2023)   Transportation Needs: Unknown (2023)    PRAPARE - Transportation     Lack of Transportation (Medical): Not on file     Lack of Transportation (Non-Medical): No   Physical Activity: Not on file   Stress: Not on file   Social Connections: Not on file   Intimate Partner Violence: Not on file   Housing Stability: Unknown (2023)    Housing Stability Vital Sign     Unable to Pay for Housing in the Last Year: Not on file     Number of Places Lived in the Last Year: Not on file     Unstable Housing in the Last Year: No       Family History:   Family History   Problem Relation Age of Onset    Diabetes Mother     Heart Attack Mother 55    Heart Attack Father         young    Cancer Maternal Aunt         breast, uterine, cervical    Heart Attack Maternal Uncle         3     Heart Disease Other        VITAL SIGNS:   BP (!) 161/92   Pulse 76   Temp 97.8 °F (36.6 °C) (Axillary)

## 2024-08-29 NOTE — PLAN OF CARE
Problem: Pain  Goal: Verbalizes/displays adequate comfort level or baseline comfort level  Outcome: Progressing  Flowsheets  Taken 8/29/2024 1200  Verbalizes/displays adequate comfort level or baseline comfort level:   Encourage patient to monitor pain and request assistance   Assess pain using appropriate pain scale   Administer analgesics based on type and severity of pain and evaluate response   Implement non-pharmacological measures as appropriate and evaluate response  Taken 8/29/2024 1100  Verbalizes/displays adequate comfort level or baseline comfort level:   Encourage patient to monitor pain and request assistance   Assess pain using appropriate pain scale   Administer analgesics based on type and severity of pain and evaluate response   Implement non-pharmacological measures as appropriate and evaluate response  Taken 8/29/2024 1000  Verbalizes/displays adequate comfort level or baseline comfort level:   Encourage patient to monitor pain and request assistance   Assess pain using appropriate pain scale   Administer analgesics based on type and severity of pain and evaluate response   Implement non-pharmacological measures as appropriate and evaluate response  Taken 8/29/2024 0900  Verbalizes/displays adequate comfort level or baseline comfort level:   Encourage patient to monitor pain and request assistance   Assess pain using appropriate pain scale   Administer analgesics based on type and severity of pain and evaluate response   Implement non-pharmacological measures as appropriate and evaluate response  Taken 8/29/2024 0800  Verbalizes/displays adequate comfort level or baseline comfort level:   Encourage patient to monitor pain and request assistance   Assess pain using appropriate pain scale   Administer analgesics based on type and severity of pain and evaluate response   Implement non-pharmacological measures as appropriate and evaluate response     Problem: Safety - Adult  Goal: Free from fall

## 2024-08-29 NOTE — PROGRESS NOTES
Physical Therapy  Physical Therapy Initial Assessment     Name: Valerie Pulido  : 1975  MRN: [id number]      Date of Service: 2024    Evaluating PT:  Pedro Vincent PT, DPT [id number]    Room #:  9443/3803-A  Diagnosis:  Acute cerebrovascular accident (CVA) ([facility]) [I63.9]  Cerebrovascular accident (CVA), unspecified mechanism (HCC) [I63.9]  PMHx/PSHx:    Past Medical History:   Diagnosis Date    Acute cerebrovascular accident (CVA) ([facility]) 2024    Chest pain 2012    Chronic back pain     Chronic neck pain     Depression     DJD (degenerative joint disease) of lumbar spine     Dyslipidemia 2023    Essential hypertension 2023    Grand mal seizure ([facility])     9 years ago    Headache     History of cardiovascular stress test 2012    LEXISCAN    Hypoglycemic syndrome     Nausea & vomiting     Ovarian cyst     Seizure disorder ([facility]) 2012    Spina bifida ([facility])     Stroke-like episode 2024     Procedure/Surgery:   TNK  Precautions:  Falls, cognition, aphasia, chair alarm  Equipment Needs:  TBD    SUBJECTIVE:    Pt lives with  and roommates in a multi-story home, 1st floor setup, with 4 step(s) to enter and 2 rail(s).      Pt ambulated with SPC for longer distance and was independent PTA.    OBJECTIVE:   Initial Evaluation  Date: 24 Treatment Short Term/ Long Term   Goals   AM-PAC 6 Clicks      Was pt agreeable to Eval/treatment? yes     Does pt have pain? Chronic pain but no further details given     Bed Mobility  Rolling: NT  Supine to sit: Barrera with HOB elevated  Sit to supine: NT  Scooting: Barrera  Mod Independent   Transfers Sit to stand: Barrera  Stand to sit: Barrera  Stand pivot: Barrera with HHA  Mod Independent with AAD   Ambulation   60 feet x 2 reps with Barrera to ModA with HHA  >400 feet with Mod Independent with AAD   Stair negotiation: ascended and descended NT  >4 steps with 1 rail Mod Independent   ROM BUE:  Defer to OT note  BLE:  WFL     Strength BUE:

## 2024-08-29 NOTE — CARE COORDINATION
Case Management Assessment  Initial Evaluation    Date/Time of Evaluation: 8/29/2024 8:59 AM  Assessment Completed by: Saurav Conde RN    If patient is discharged prior to next notation, then this note serves as note for discharge by case management.    Patient Name: Valerie Pulido                   YOB: 1975  Diagnosis: Acute cerebrovascular accident (CVA) (HCC) [I63.9]  Cerebrovascular accident (CVA), unspecified mechanism (HCC) [I63.9]                   Date / Time: 8/28/2024  7:51 PM    Patient Admission Status: Inpatient   Readmission Risk (Low < 19, Mod (19-27), High > 27): Readmission Risk Score: 10    Current PCP: Shahram Bhagat, DO  PCP verified by CM? Yes    Chart Reviewed: Yes      History Provided by: Patient  Patient Orientation: Alert and Oriented    Patient Cognition: Alert    Hospitalization in the last 30 days (Readmission):  No    If yes, Readmission Assessment in CM Navigator will be completed.    Advance Directives:      Code Status: Full Code   Patient's Primary Decision Maker is: Legal Next of Kin    Primary Decision Maker: Chapincito Pulido - Spouse - [phone]    Discharge Planning:    Patient lives with:   Type of Home:    Primary Care Giver: Self  Patient Support Systems include: Spouse/Significant Other   Current Financial resources:    Current community resources:    Current services prior to admission:              Current DME:              Type of Home Care services:       ADLS  Prior functional level: Independent in ADLs/IADLs  Current functional level: Independent in ADLs/IADLs    PT AM-PAC:   /24  OT AM-PAC:   /24    Family can provide assistance at DC: Yes  Would you like Case Management to discuss the discharge plan with any other family members/significant others, and if so, who? No  Plans to Return to Present Housing: Yes  Other Identified Issues/Barriers to RETURNING to current housing:     Potential Assistance needed at discharge:              Potential DME:     Patient expects to discharge to:    Plan for transportation at discharge:      Financial    Payor: [employer] [place] / Plan: [employer] [place] / Product Type: *No Product type* /     Does insurance require precert for SNF: Yes    Potential assistance Purchasing Medications:    Meds-to-Beds request:        [facility] [address] [place] [address] [address] [facility] [phone] - F [phone] [address] [facility] [address]  Phone: [phone] Fax: [phone]      Notes:    Factors facilitating achievement of predicted outcomes: Family support    Barriers to discharge:       Additional Case Management Notes:       The Plan for Transition of Care is related to the following treatment goals of Acute cerebrovascular accident (CVA) (HCC) [I63.9]  Cerebrovascular accident (CVA), unspecified mechanism (HCC) [I63.9]    IF APPLICABLE: The Patient and/or patient representative Valerie and her family were provided with a choice of provider and agrees with the discharge plan. Freedom of choice list with basic dialogue that supports the patient's individualized plan of care/goals and shares the quality data associated with the providers was provided to:     Patient Representative Name:       The Patient and/or Patient Representative Agree with the Discharge Plan?      Saurav Conde RN  Case Management Department

## 2024-08-29 NOTE — PROGRESS NOTES
OCCUPATIONAL THERAPY INITIAL EVALUATION    [facility]  [address], [place]       Date:2024                                                               Patient Name: Valerie Pulido  MRN: [id number]  : 1975  Room: [address] [place] [address]-A    Evaluating OT: Estelita Saldana, OTR/L [id number]    Referring Provider:   izing   Jean Carlos Lawson MD      Specific Provider Orders/Date: OT eval and treat (24 )        Diagnosis: Acute cerebrovascular accident (CVA) (HCC) [I63.9]  Cerebrovascular accident (CVA), unspecified mechanism (HCC) [I63.9]        Reason for admission:  49 year old female with a PMH of migraines, Chiari 1 malformation, seizure, HTN, fibromyalgia presented to ED with aphasia and whole body weakness.  She received TNK and had complete resolution of symptoms.            Surgery/Procedures:   TNK            Pertinent Medical History:    Past Medical History:   Diagnosis Date    Acute cerebrovascular accident (CVA) ([facility]) 2024    Chest pain 2012    Chronic back pain     Chronic neck pain     Depression     DJD (degenerative joint disease) of lumbar spine     Dyslipidemia 2023    Essential hypertension 2023    Grand mal seizure ([facility])     9 years ago    Headache     History of cardiovascular stress test 2012    LEXISCAN    Hypoglycemic syndrome     Nausea & vomiting     Ovarian cyst     Seizure disorder (HCC) 2012    Spina bifida ([facility])     Stroke-like episode 2024          *Precautions:  Fall Risk, alarms,  BP goal < 180, functionally stronger than MMT, aphasia , cognition    Assessment of current deficits   [x] Functional mobility  [x]ADLs  [x] Strength               [x]Cognition   [x] Functional transfers   [x] IADLs         [x] Safety Awareness   [x]Endurance   [x] Fine Coordination        [x] ROM     [x] Vision/perception   []Sensation    [x]Gross Motor Coordination [x] Balance   [] Delirium    PLOF    Patient and/or family were instructed/educated on diagnosis, prognosis/goals and plan of care. demonstrated fair understanding.      Evaluation Complexity: Moderate     History: Expanded chart review of consults, imaging, and psychosocial history related to current functional performance.   Exam: 5+ performance deficits identified limiting functional independence and safe return home   Assistance/Modification: Min/mod assistance or modifications required to perform tasks. May have comorbidities that affect occupational performance.    [] Malnutrition indicators have been identified and nursing has been notified to ensure a dietitian consult is ordered.      Time In:1015             Time Out: 1045         Total Treatment time: 15   Min Units   OT Eval Low 53416     OT Eval Medium 44325 X    OT Eval High 93164     OT Re-Eval 84586     Therapeutic Ex 17895     Therapeutic Activities 71454 15 1   ADL/Self Care 08111     Orthotic Management 89618     Neuro Re-Ed 96897     Non-Billable Time        Evaluation time includes thorough review of current medical information, gathering information on past medical history/social history and prior level of function, completion of standardized testing/informal observation of tasks, assessment of data and development of POC/Goals.     Estelita Saldana, OTR/L [id number]

## 2024-08-29 NOTE — VIRTUAL HEALTH
Valerie Pulido, was and interprofessional telephone consultation for emergent care as a stroke alert for this patient. History and images and treatment plan of care was discussed in multiple phone calls to take care of this patient emergently    The patient was located at [facility] EMERGENCY  The provider was located at Home (City/State): [facility]  Confirm you are appropriately licensed, registered, or certified to deliver care in the state where the patient is located as indicated above. If you are not or unsure, please re-schedule the visit: Yes, I confirm.   Consults  Delayed entry note, consultation was provided immediately , documentation performed later due to triage of clinical duties                I was contacted by the ED team 0906 hours to review an acute code stroke  and review of cerebral vasculature imaging study to investigate if there is an large vessel occlusion. The patient has a NIHSS of 5 to  8 . Head CT is negative for ICH . The last known well time was reported as witnessed     Aphasia     The cerebral vascular imaging demonstrates NO LVO.       VIZ LVO was utilized to assist with triage    Modified Hay Scale 0      Assessment:  1. Suspected acute ischemic stroke   2. Stroke like episode  3. Aphasia      Acknowledge - effort related weakness on EMG per  note. She also mentions CVA on MRI, given scenario TNK was discussed as FND is a diagnosis that her OP neurologist should consider( per AHA/ASA guideliens)      Plan:  1.Patient is an IV-TNK candidate:    No history of recent bleeding, no history of brain bleed/aneurysm/brain tumor, or recent surgery/trauma.    IV-TNK Consent: I have discussed with  who has  informed [facility] of all the associated risks including 1%-3% of sich/death, benefits of potential improved thrombolysis, and alternative of using IV tenectaplase instead of IV t-PA. The patient and/or family voluntarily consent to the administration of IV  catheters if the patient can be safely managed without them.   --Obtain a follow-up CT or MRI scan at 24 hours after IV t-NK before starting anticoagulants or antiplatelet agents.  No antiplatelet agent or SQ heparinoids for 24 hrs.  --Keep NPO unless passes bedside dysphagia screen or swallow evaluation.  --Obtain MRI brain w/o contrast,transthoracic echo-to r/o structural heart disease that can lead to source of emboli, fasting lipid panel, HgbA1c.   --Smoking cessation education and nicotine patch if indicated  --IVF with NS @ 100cc per hour if Cardiac status should tolerate  --PT/OT/SLP  - Angioedema checks @30 min @60 min @120 min       2. No large vessel occlusion noted. No endovascular stroke therapy needed.  3.Please consult general Neurology and Hospitalist to follow closely.        TIME SPENT IN MEDICAL DECISION MAKING / REVIEW OF CASE AND IMAGING / DISCUSSION OF CASE WITH PHYSICIANS INVOLVED IN  THE ACUTE CARE= 35 min         Total time spent on this encounter:  35 min     --Alejo Montoya MD on 8/29/2024 at 9:05 AM    An electronic signature was used to authenticate this note.

## 2024-08-29 NOTE — PROGRESS NOTES
SPEECH/LANGUAGE PATHOLOGY  CLINICAL ASSESSMENT OF SWALLOWING FUNCTION   and PLAN OF CARE  PATIENT NAME:  Valerie Pulido  (female)     MRN:  [id number]    :  1975  (49 y.o.)  STATUS:  Inpatient: Room 3803/3803-A    TODAY'S DATE:  2024  REFERRING PROVIDER:     SPECIFIC PROVIDER ORDER: speech language pathology (SLP) eval and treat Date of order:  24  REASON FOR REFERRAL: CVA   EVALUATING THERAPIST: RENE Fonseca                 ASSESSMENT:    DYSPHAGIA DIAGNOSIS:   functional oropharyngeal swallow for age/premorbid functioning      DIET RECOMMENDATIONS:  Regular consistency solids (IDDSI level 7) with  thin liquids (IDDSI level 0)     FEEDING RECOMMENDATIONS:     Assistance level:  No assistance needed      Compensatory strategies recommended: No strategies are recommended at this time      Discussed recommendations with:  patient nurse in person    SPEECH THERAPY  PLAN OF CARE   The dysphagia POC is established based on physician order, dysphagia diagnosis and results of clinical assessment     Dysphagia therapy is not recommended     Conditions Requiring Skilled Therapeutic Intervention for dysphagia:    not applicable    Specific dysphagia interventions to include:     Not applicable    Specific instructions for next treatment:  not applicable   Patient Treatment Goals:    Short Term Goals:  Not applicable no therapy warranted     Long Term Goals:   Not applicable no therapy warranted      Patient/family Goal:    not applicable                    ADMITTING DIAGNOSIS: Acute cerebrovascular accident (CVA) ([facility]) [I63.9]  Cerebrovascular accident (CVA), unspecified mechanism ([facility]) [I63.9]    VISIT DIAGNOSIS:      PATIENT REPORT/COMPLAINT: denies difficulty swallowing  nursing not available at time of evaluation chart reviewed and patient is not NPO for any procedures per current documentation.     PRIOR LEVEL OF SWALLOW FUNCTION:    PAST HISTORY OF DYSPHAGIA?: none reported    Home diet: Regular  consistency solids (IDDSI level 7) with  thin liquids (IDDSI level 0)    Current Diet Order:  ADULT DIET; Regular    PROCEDURE:  Consistencies Administered During the Evaluation   Liquids: thin liquid   Solids:  pureed foods and soft solid foods      Method of Intake:   cup, straw, spoon  Self fed      Position:   Seated, upright    CLINICAL ASSESSMENT  Oral Stage:       The oral stage of swallowing was within functional limits      Pharyngeal Stage:    No signs of aspiration were noted during this evaluation however, silent aspiration cannot be ruled out at bedside.  If silent aspiration is suspected, a Videofluoroscopic Study of Swallowing (MBS) is recommended and requires a physician order.    Cognition:   Confusion noted    Oral Peripheral Examination   Adequate lingual/labial strength     Current Respiratory Status    room air     Parameters of Speech Production  Respiration:  Adequate for speech production  Quality:   Within functional limits  Intensity: Within functional limits    Volitional Swallow: Present    Volitional Cough:    Present    Pain: No pain reported.     EDUCATION:   The Speech Language Pathologist (SLP) completed education regarding results of evaluation and that intervention is not warranted at this time.  Learner: Patient  Education:  Reviewed results and recommendations of this evaluation  Evaluation of Education: Verbalizes understanding    This plan will be re-evaluated and revised in 1 week  if warranted.      CPT code:  43266  bedside swallow eval      [x]The admitting diagnosis and active problem list, have been reviewed prior to initiation of this evaluation.        ACTIVE PROBLEM LIST:   Patient Active Problem List   Diagnosis    Chronic pain syndrome    Low back pain radiating to left leg    DDD (degenerative disc disease)    Spondylolisthesis of lumbar region    Protruded lumbar disc    DDD (degenerative disc disease), lumbar    Spondylolisthesis, grade 1    DDD (degenerative disc

## 2024-08-29 NOTE — ACP (ADVANCE CARE PLANNING)
Advance Care Planning   Healthcare Decision Maker:    Primary Decision Maker: PulidoChapincito - [facility] - [phone]    Click here to complete Healthcare Decision Makers including selection of the Healthcare Decision Maker Relationship (ie \"Primary\").

## 2024-08-29 NOTE — CARE COORDINATION
8/29 Care Coordination: Pt presenting to the ED for stroke.Patient received TNK and transferred to neuro ICU. Spoke with pt in her room. Introduced self and role of CM.  PTA Pt is from home , lives with her spouse. She states she was Independent. Uses a cane if going long distances. HAs no Hx of MICHELLE. Hx of C long time ago does not remember name. Pt states she feels back to base line. Is ready to go home. CM/SW will continue to follow for discharge planning.   Saurav YIP,RN--BC  [phone]

## 2024-08-29 NOTE — CONSULTS
NEUROLOGY CONSULT NOTE    Patient: Valerie Pulido   : 1975   MRN: [id number]   Date of Service: 2024     History of Present Illness:    Valerie is a 49-year-old right-handed  woman with history of hypertension and questionable seizure disorder from the past.  She saw Dr Sweeney as an outpatient in May 2024 for headaches, chronic pain syndrome and bilateral hand numbness.  Based on her assessment, the patient had \"seizures\" secondary to stress back in .  She was brought to the emergency department yesterday by her  with chief complaints of difficulty with speech and right-sided weakness.  Code stroke imaging did not show any acute intracranial abnormality.  She subsequently got IV TNK and admitted to the ICU.  Neurology service has now been consulted for further evaluation.    Past Medical History:    Past Medical History:   Diagnosis Date    Acute cerebrovascular accident (CVA) (HCC) 2024    Chest pain 2012    Chronic back pain     Chronic neck pain     Depression     DJD (degenerative joint disease) of lumbar spine     Dyslipidemia 2023    Essential hypertension 2023    Grand mal seizure (HCC)     9 years ago    Headache     History of cardiovascular stress test 2012    LEXISCAN    Hypoglycemic syndrome     Nausea & vomiting     Ovarian cyst     Seizure disorder (HCC) 2012    Spina bifida ([facility])     Stroke-like episode 2024       Past Surgical History:  Past Surgical History:   Procedure Laterality Date    BACK SURGERY       SECTION  2000    CHOLECYSTECTOMY, LAPAROSCOPIC  3/11/16    with IOC    COLONOSCOPY      ECHO COMPL W DOP COLOR FLOW  2012         ENDOSCOPY, COLON, DIAGNOSTIC      GASTRIC BYPASS SURGERY      HYSTERECTOMY (CERVIX STATUS UNKNOWN)      KNEE ARTHROSCOPY Right 1984    x4 or 5    NERVE BLOCK  13    LUMBAR EPIDURAL #1    NERVE BLOCK      lumbar epidural    NERVE BLOCK  2013    lumbar epidural #3

## 2024-08-29 NOTE — TELEPHONE ENCOUNTER
I called patient to confirm her appt tomorrow and she informed me that she's been admitted into [facility] for a Stroke which she had yesterday.  I informed her that I will let Dr. Bhagat know and I wished her a speedy recovery.    Last seen 7/16/2024  Next appt 11/19/2024    Requested Prescriptions      No prescriptions requested or ordered in this encounter

## 2024-08-29 NOTE — PROGRESS NOTES
SPEECH/LANGUAGE PATHOLOGY  SPEECH/LANGUAGE/COGNITIVE EVALUATION   and PLAN OF CARE      PATIENT NAME:  Valerie Pulido  (female)     MRN:  [id number]    :  1975  (49 y.o.)  STATUS:  Inpatient: Room 3803/3803-A    TODAY'S DATE:  2024  REFERRING PROVIDER:      SPECIFIC PROVIDER ORDER: speech language pathology (SLP) eval and treat  Date of order:  24  REASON FOR REFERRAL:  CVA  EVALUATING THERAPIST: RENE Fonseca    ADMITTING DIAGNOSIS: Acute cerebrovascular accident (CVA) (HCC) [I63.9]  Cerebrovascular accident (CVA), unspecified mechanism (HCC) [I63.9]    VISIT DIAGNOSIS:        SPEECH THERAPY  PLAN OF CARE   The speech therapy  POC is established based on physician order, speech pathology diagnosis and results of clinical assessment     SPEECH PATHOLOGY DIAGNOSIS:    Mild confusion, inconsistent throughout evaluation    Speech Pathology intervention is recommended 3-6 times per week for LOS or when goals are met with emphasis on the following:      Conditions Requiring Skilled Therapeutic Intervention for speech, language and/or cognition    Decreased short term memory  Decreased thought organization    Specific Speech Therapy Interventions to Include:   Therapeutic tasks for Cognition    Specific instructions for next treatment:     To initiate memory tasks  To initiate thought organization tasks    SHORT/LONG TERM GOALS  Pt will improve immediate, short term, recent memory during structured and unstructured tasks with 75% accuracy   Pt will improve problem solving/thought organization during structured and unstructured tasks with 75% accuracy     Patient goals: Patient/family involved in developing goals and treatment plan:   Treatment goals discussed with Patient    The Patient understand(s) the diagnosis, prognosis and plan of care   The patient/family Agreed with above,     This plan may be re-evaluated and revised as warranted.        Rehabilitation Potential/Prognosis: good                 CLINICAL ASSESSMENT:  MOTOR SPEECH       Oral Peripheral Examination   Generalized oral weakness    Parameters of Speech Production  Respiration:  Adequate for speech production  Articulation:  Within functional limits  Resonance:  Within functional limits  Quality:   Within functional limits  Pitch:    Within functional limits  Intensity: Within functional limits  Fluency:  Intact  Prosody Intact    Speech Intelligibility      Good given unstructured task and unknown context             RECEPTIVE LANGUAGE    Comprehension of Yes/No Questions:   Within functional limits    Process  Simple Verbal Commands:   Within functional limits  Process Intermediate Verbal Commands:   Within functional limits  Process Complex Verbal Commands:     Incomplete    Comprehension of Conversation:      Within functional limits      EXPRESSIVE LANGUAGE     Serials: Functional    Imitation:  Words   Functional   Sentences Functional    Naming:  (Modality used:  Verbal)  Confrontation Naming  Functional  Functional Description  Functional  Response Naming: Functional    Conversation:      Confusion was noted during conversation    COGNITION     Attention/Orientation  Attention: Sustained attention   Orientation:  Oriented to Person, Place, Date, Reason for hospitalization    Memory   Immediate Recall: Repeated 3/3    Delayed Recall:   Recalled 0/3    Long Term Recall:   Recalled Address, Birthdate, Age, and Family    Organization/Problem Solving/Reasoning   Verbal Sequencing:   Functional        Verbal Problem solving:   Impaired          CLINICAL OBSERVATIONS NOTED DURING THE EVALUATION  Latent responses                  EDUCATION:   The Speech Language Pathologist (SLP) completed education regarding results of evaluation and that intervention is warranted at this time.  Learner: Patient  Education: Reviewed results and recommendations of this evaluation  Evaluation of Education:  Verbalizes understanding    Evaluation  Time includes thorough review of current medical information, gathering information on past medical history/social history and prior level of function, completion of standardized testing/informal observation of tasks, assessment of data and education on plan of care and goals.      CPT code:    17169  eval speech sound lang comprehension      The admitting diagnosis and active problem list, as listed below have been reviewed prior to initiation of this evaluation.        ACTIVE PROBLEM LIST:   Patient Active Problem List   Diagnosis    Chronic pain syndrome    Low back pain radiating to left leg    DDD (degenerative disc disease)    Spondylolisthesis of lumbar region    Protruded lumbar disc    DDD (degenerative disc disease), lumbar    Spondylolisthesis, grade 1    DDD (degenerative disc disease), thoracic    Facet syndrome, lumbar    Low back pain    Osteoarthritis of left hip    Anxiety and depression    Lumbar radiculopathy    Back pain    Vitamin D deficiency    Cigarette nicotine dependence without complication    Dyslipidemia    Essential hypertension    Cervicalgia    Myalgia    Fibromyalgia    Hyperparathyroidism (HCC)    Chiari I malformation (HCC)    Nasal congestion    Acute cerebrovascular accident (CVA) (HCC)    Stroke-like episode    Cerebrovascular accident (CVA) ([facility])

## 2024-08-29 NOTE — H&P
[facility] Hospitalist Group History and Physical      CHIEF COMPLAINT: Aphasia and weakness    History of Present Illness: This is 49-year-old female with no significant past medical history was not taking any medication brought in here by her  due to sudden onset of aphasia and weakness of her right side of the body.  Her onset was definite and  mentioned she was known well on 1753.  As per ER physician note patient suddenly developed aphasia and her right side of the body was not working.  Stroke alert was initiated CT scan and CTA did not show any abnormalities.  Patient received TNK and transferred to neuro ICU.  During my encounter with patient could not remember much history history taken from ER physician.  Currently she does not have any weakness or aphasia.    Informant(s) for H&P: Patient    REVIEW OF SYSTEMS:  A comprehensive review of systems was negative except for: what is in the HPI      PMH:  Past Medical History:   Diagnosis Date    Acute cerebrovascular accident (CVA) ([facility]) 2024    Chest pain 2012    Chronic back pain     Chronic neck pain     Depression     DJD (degenerative joint disease) of lumbar spine     Dyslipidemia 2023    Essential hypertension 2023    Grand mal seizure ([facility])     9 years ago    Headache     History of cardiovascular stress test 2012    LEXISCAN    Hypoglycemic syndrome     Nausea & vomiting     Ovarian cyst     Seizure disorder ([facility]) 2012    Spina bifida ([facility])        Surgical History:  Past Surgical History:   Procedure Laterality Date    BACK SURGERY       SECTION      CHOLECYSTECTOMY, LAPAROSCOPIC  3/11/16    with IOC    COLONOSCOPY      ECHO COMPL W DOP COLOR FLOW  2012         ENDOSCOPY, COLON, DIAGNOSTIC      GASTRIC BYPASS SURGERY      HYSTERECTOMY (CERVIX STATUS UNKNOWN)      KNEE ARTHROSCOPY Right 1984    x4 or 5    NERVE BLOCK  13    LUMBAR EPIDURAL #1    NERVE BLOCK      lumbar  epidural    NERVE BLOCK  09 18 2013    lumbar epidural #3    PARTIAL HYSTERECTOMY (CERVIX NOT REMOVED)      STAN-EN-Y GASTRIC BYPASS  2004    TONSILLECTOMY AND ADENOIDECTOMY  1977    TUBAL LIGATION  2001       Medications Prior to Admission:    Prior to Admission medications    Medication Sig Start Date End Date Taking? Authorizing Provider   pregabalin (LYRICA) 150 MG capsule Take 1 capsule by mouth in the morning, at noon, and at bedtime for 90 days. Max Daily Amount: 450 mg 8/15/24 11/13/24  Madeline Cuadra DO   propranolol (INDERAL) 10 MG tablet Take 1 tablet by mouth 2 times daily 8/7/24   Lucinda Sweeney MD   Erenumab-aooe (AIMOVIG) 140 MG/ML SOAJ Inject 140 mg into the skin every 28 days 8/7/24   Lucinda Sweeney MD   aspirin 81 MG EC tablet Take 1 tablet by mouth daily 8/7/24   Lucinda Sweeney MD   Ubrogepant (UBRELVY) 100 MG TABS Take 100 mg by mouth as needed (migraine) 8/7/24   Lucinda Sweeney MD   DULoxetine (CYMBALTA) 30 MG extended release capsule Take 1 capsule by mouth daily for 7 days 7/16/24 8/15/24  Shahram Bhagat DO   DULoxetine (CYMBALTA) 60 MG extended release capsule Take 1 capsule by mouth daily 7/23/24   Shahram Bhagat DO   fluticasone (FLONASE) 50 MCG/ACT nasal spray 1 spray by Each Nostril route daily 7/16/24   Shahram Bhagat DO   amLODIPine (NORVASC) 10 MG tablet Take 1 tablet by mouth once daily 7/1/24   Shahram Bhagat DO   methocarbamol (ROBAXIN) 500 MG tablet Take 1 tablet by mouth 3 times daily 5/24/24   Lucinda Sweeney MD   RA LAXATIVE 17 GM/SCOOP powder Take 17 g by mouth daily 12/29/23   Brandon Lowe MD   NONFORMULARY Edibles for shoulder, arm pain; bones in left leg    ProviderBrandon MD       Allergies:    Tetanus toxoids, Ancef [cefazolin sodium], Lithium, and Morphine    Social History:    reports that she has been smoking cigarettes. She started smoking about 11 years ago. She has a 11.7 pack-year smoking history. She has

## 2024-08-29 NOTE — PROGRESS NOTES
[facility] Progress [facility]      SYNOPSIS: Patient admitted on 2024 for aphasia and weakness.  Noted to have stroke.  Received TNKase.  Admitted to the ICU.      SUBJECTIVE:    Patient seen and examined  Seen in SICU  Doing better      Stable overnight. No other overnight issues reported.   Temp (24hrs), Av.1 °F (36.7 °C), Min:97.7 °F (36.5 °C), Max:99.5 °F (37.5 °C)    DIET: ADULT DIET; Regular  CODE: Full Code    Intake/Output Summary (Last 24 hours) at 2024 1443  Last data filed at 2024 1400  Gross per 24 hour   Intake 2070.21 ml   Output 1850 ml   Net 220.21 ml       OBJECTIVE:    /73   Pulse 54   Temp 98.2 °F (36.8 °C) (Axillary)   Resp 18   Ht 1.524 m (5')   Wt 70.7 kg (155 lb 13.8 oz)   SpO2 98%   BMI 30.44 kg/m²     General appearance: No apparent distress, appears stated age and cooperative.  HEENT:  Conjunctivae/corneas clear.   Neck: Supple. No jugular venous distention.   Respiratory: Clear to auscultation bilaterally, normal respiratory effort  Cardiovascular: Regular rate rhythm, normal S1-S2  Abdomen: Soft, nontender, nondistended  Musculoskeletal: No clubbing, cyanosis, no bilateral lower extremity edema. Brisk capillary refill.   Skin:  No rashes  on visible skin  Neurologic: awake, alert and following commands     ASSESSMENT:    Acute stroke status post TNKase  Depression by history  Hypertension  Dyslipidemia  History of seizures       PLAN:    Pending MRI CT scans  Status post TNKase  Intensivist following        DISPOSITION: Remains inpatient pending further workup    Medications:  REVIEWED DAILY      Infusion Medications    sodium chloride      sodium chloride Stopped (24 1104)     Scheduled Medications    LORazepam  0.5 mg IntraVENous Once    sodium chloride flush  5-40 mL IntraVENous 2 times per day    [Held by provider] amLODIPine  10 mg Oral Daily    [Held by provider] aspirin  81 mg Oral Daily    DULoxetine  60 mg Oral Daily

## 2024-08-29 NOTE — PLAN OF CARE
Problem: Pain  Goal: Verbalizes/displays adequate comfort level or baseline comfort level  8/29/2024 1731 by Destiny Vigil RN  Outcome: Progressing  Flowsheets  Taken 8/29/2024 1700  Verbalizes/displays adequate comfort level or baseline comfort level:   Encourage patient to monitor pain and request assistance   Assess pain using appropriate pain scale   Administer analgesics based on type and severity of pain and evaluate response   Implement non-pharmacological measures as appropriate and evaluate response  Taken 8/29/2024 1600  Verbalizes/displays adequate comfort level or baseline comfort level:   Encourage patient to monitor pain and request assistance   Assess pain using appropriate pain scale   Administer analgesics based on type and severity of pain and evaluate response   Implement non-pharmacological measures as appropriate and evaluate response  Taken 8/29/2024 1500  Verbalizes/displays adequate comfort level or baseline comfort level:   Encourage patient to monitor pain and request assistance   Assess pain using appropriate pain scale   Administer analgesics based on type and severity of pain and evaluate response   Implement non-pharmacological measures as appropriate and evaluate response  Taken 8/29/2024 1400  Verbalizes/displays adequate comfort level or baseline comfort level:   Encourage patient to monitor pain and request assistance   Assess pain using appropriate pain scale   Administer analgesics based on type and severity of pain and evaluate response   Implement non-pharmacological measures as appropriate and evaluate response  8/29/2024 1306 by Destiny Vigil RN  Outcome: Progressing  Flowsheets  Taken 8/29/2024 1300  Verbalizes/displays adequate comfort level or baseline comfort level:   Encourage patient to monitor pain and request assistance   Assess pain using appropriate pain scale   Administer analgesics based on type and severity of pain and evaluate response   Implement  non-pharmacological measures as appropriate and evaluate response  Taken 8/29/2024 1200  Verbalizes/displays adequate comfort level or baseline comfort level:   Encourage patient to monitor pain and request assistance   Assess pain using appropriate pain scale   Administer analgesics based on type and severity of pain and evaluate response   Implement non-pharmacological measures as appropriate and evaluate response  Taken 8/29/2024 1100  Verbalizes/displays adequate comfort level or baseline comfort level:   Encourage patient to monitor pain and request assistance   Assess pain using appropriate pain scale   Administer analgesics based on type and severity of pain and evaluate response   Implement non-pharmacological measures as appropriate and evaluate response  Taken 8/29/2024 1000  Verbalizes/displays adequate comfort level or baseline comfort level:   Encourage patient to monitor pain and request assistance   Assess pain using appropriate pain scale   Administer analgesics based on type and severity of pain and evaluate response   Implement non-pharmacological measures as appropriate and evaluate response  Taken 8/29/2024 0900  Verbalizes/displays adequate comfort level or baseline comfort level:   Encourage patient to monitor pain and request assistance   Assess pain using appropriate pain scale   Administer analgesics based on type and severity of pain and evaluate response   Implement non-pharmacological measures as appropriate and evaluate response  Taken 8/29/2024 0800  Verbalizes/displays adequate comfort level or baseline comfort level:   Encourage patient to monitor pain and request assistance   Assess pain using appropriate pain scale   Administer analgesics based on type and severity of pain and evaluate response   Implement non-pharmacological measures as appropriate and evaluate response     Problem: Safety - Adult  Goal: Free from fall injury  8/29/2024 1731 by Destiny Vigil RN  Outcome:

## 2024-08-29 NOTE — PROGRESS NOTES
4 Eyes Skin Assessment     NAME:  Valerie Pulido  YOB: 1975  MEDICAL RECORD NUMBER:  [id number]    The patient is being assessed for  Admission    I agree that at least one RN has performed a thorough Head to Toe Skin Assessment on the patient. ALL assessment sites listed below have been assessed.      Areas assessed by both nurses:    Head, Face, Ears, Shoulders, Back, Chest, Arms, Elbows, Hands, Sacrum. Buttock, Coccyx, Ischium, and Legs. Feet and Heels    Scar MLI from recent gastric bypass         Does the Patient have a Wound? No noted wound(s)       Andrea Prevention initiated by RN: No  Wound Care Orders initiated by RN: No    Pressure Injury (Stage 3,4, Unstageable, DTI, NWPT, and Complex wounds) if present, place Wound referral order by RN under : No    New Ostomies, if present place, Ostomy referral order under : Yes     Nurse 1 eSignature: Electronically signed by John Patel RN on 8/28/24 at 10:57 PM EDT    **SHARE this note so that the co-signing nurse can place an eSignature**    Nurse 2 eSignature: Electronically signed by Casie Howard RN on 8/28/24 at 10:59 PM EDT

## 2024-08-29 NOTE — ED PROVIDER NOTES
Department of Emergency Medicine   ED  Provider Note  Admit Date/RoomTime: 2024  7:51 PM  ED Room:           History of Present Illness:  24, Time: 8:45 PM EDT  Chief Complaint   Patient presents with    Cerebrovascular Accident     (Lkw 17:53, bilat leg/arm weakness, unable to speak)                Valerie Pulido is a 49 y.o. female presenting to the ED for stroke.  Patient's last well-known at 1753.  Witnessed by .  They were talking, she went aphasic, and she states half her body stopped working.  Has a remote history of a stroke.  On no anticoagulation.  Does have a Chiari malformation.  No surgical history of the brain or head otherwise.  No other information is available at this time.    Review of Systems:   Pertinent positives and negatives are stated within HPI, all other systems reviewed and are negative.        --------------------------------------------- PAST HISTORY ---------------------------------------------  Past Medical History:  has a past medical history of Acute cerebrovascular accident (CVA) (HCC), Chest pain, Chronic back pain, Chronic neck pain, Depression, DJD (degenerative joint disease) of lumbar spine, Dyslipidemia, Essential hypertension, Grand mal seizure (HCC), Headache, History of cardiovascular stress test, Hypoglycemic syndrome, Nausea & vomiting, Ovarian cyst, Seizure disorder (HCC), and Spina bifida (HCC).    Past Surgical History:  has a past surgical history that includes Gastric bypass surgery ();  section (); Partial hysterectomy; Tubal ligation (); ECHO Compl W Dop Color Flow (2012); Knee arthroscopy (Right, ); Nerve Block (13); Nerve Block; Nerve Block (2013); Tonsillectomy and adenoidectomy (); Shannon-en-Y Gastric Bypass (); Hysterectomy; back surgery (); Colonoscopy; Endoscopy, colon, diagnostic; and Cholecystectomy, laparoscopic (3/11/16).    Social History:  reports that she has been smoking  99.5 °F (37.5 °C)   Resp 20   Ht 1.524 m (5')   Wt 70.7 kg (155 lb 13.8 oz)   SpO2 95%   BMI 30.44 kg/m²     Oxygen Saturation Interpretation: Normal    The patient’s available past medical records and past encounters were reviewed.        ------------------------------ ED COURSE/MEDICAL DECISION MAKING----------------------  Medications   sodium chloride flush 0.9 % injection 5-40 mL (has no administration in time range)   sodium chloride flush 0.9 % injection 5-40 mL (has no administration in time range)   0.9 % sodium chloride infusion (has no administration in time range)   dextrose bolus 10% 125 mL (has no administration in time range)   iopamidol (ISOVUE-370) 76 % injection 75 mL (75 mLs IntraVENous Given 8/28/24 1939)   sodium chloride flush 0.9 % injection 10 mL (10 mLs IntraVENous Given 8/28/24 1957)     Followed by   TENECTEplase (TNKASE) injection 18 mg (18 mg IntraVENous Given 8/28/24 1957)     Followed by   sodium chloride flush 0.9 % injection 10 mL (10 mLs IntraVENous Given 8/28/24 1957)           The cardiac monitor revealed sinus with a heart rate in the 80s as interpreted by me. The cardiac monitor was ordered secondary to the patient's stroke and to monitor the patient for dysrhythmia.   CPT 56563         Medical Decision Making:     Patient presents for strokelike symptoms.  Concern for ischemic stroke, hemorrhagic stroke, hypoglycemia, or other pathologies.  She had NIH of 5 on arrival.  She is in the TNK window.  Stroke alert was called.    EKG inter by me shows sinus rhythm, rate 67, no STEMI, no ischemic change    Labs interperted by me shows normal CBC and CMP, coagulation studies reassuring    Head CT interpreted myself shows no obvious hemorrhage, radiology read as above    Discussed with Dr. Valiente, no LVO, agrees the patient is a TNK candidate.  Discussed TNK with the patient and her , risks of TNK were discussed, they like to proceed with TNK.  TNK was given.    Chart  reviewed, patient was seen for chronic pain syndrome on 8/15/2024    Discussed with the hospitalist, patient to be admitted to the ICU    Please note that the withdrawal or failure to initiate urgent interventions for this patient would likely result in a life threatening deterioration or permanent disability.      Accordingly this patient received 30 minutes of critical care time, excluding separately billable procedures.        Counseling:   The emergency provider has spoken with the patient and discussed today’s results, in addition to providing specific details for the plan of care and counseling regarding the diagnosis and prognosis.  Questions are answered at this time and they are agreeable with the plan.       --------------------------------- IMPRESSION AND DISPOSITION ---------------------------------    IMPRESSION  1. Cerebrovascular accident (CVA), unspecified mechanism (HCC)        DISPOSITION  Disposition: Admit to CCU/ICU  Patient condition is stable        NOTE: This report was transcribed using voice recognition software. Every effort was made to ensure accuracy; however, inadvertent computerized transcription errors may be present        Lew Sarkar MD  08/28/24 2054

## 2024-08-29 NOTE — PROCEDURES
Patient admitted to SICU with the following belongings:  None. The following belongings admitted with the patient, ALL, were sent home with the patient's family.

## 2024-08-30 ENCOUNTER — APPOINTMENT (OUTPATIENT)
Age: 49
DRG: 045 | End: 2024-08-30
Payer: COMMERCIAL

## 2024-08-30 LAB
ALBUMIN SERPL-MCNC: 3.8 G/DL (ref 3.5–5.2)
ALP SERPL-CCNC: 78 U/L (ref 35–104)
ALT SERPL-CCNC: 14 U/L (ref 0–32)
ANION GAP SERPL CALCULATED.3IONS-SCNC: 11 MMOL/L (ref 7–16)
AST SERPL-CCNC: 26 U/L (ref 0–31)
BILIRUB SERPL-MCNC: <0.2 MG/DL (ref 0–1.2)
BUN SERPL-MCNC: 11 MG/DL (ref 6–20)
CALCIUM SERPL-MCNC: 10.2 MG/DL (ref 8.6–10.2)
CHLORIDE SERPL-SCNC: 110 MMOL/L (ref 98–107)
CO2 SERPL-SCNC: 25 MMOL/L (ref 22–29)
CREAT SERPL-MCNC: 0.6 MG/DL (ref 0.5–1)
ECHO AO ASC DIAM: 2.6 CM
ECHO AO ASCENDING AORTA INDEX: 1.55 CM/M2
ECHO AV AREA PEAK VELOCITY: 2 CM2
ECHO AV AREA VTI: 2 CM2
ECHO AV AREA/BSA PEAK VELOCITY: 1.2 CM2/M2
ECHO AV AREA/BSA VTI: 1.2 CM2/M2
ECHO AV CUSP MM: 1.6 CM
ECHO AV MEAN GRADIENT: 8 MMHG
ECHO AV MEAN VELOCITY: 1.3 M/S
ECHO AV PEAK GRADIENT: 15 MMHG
ECHO AV PEAK VELOCITY: 1.9 M/S
ECHO AV VELOCITY RATIO: 0.68
ECHO AV VTI: 42.1 CM
ECHO EST RA PRESSURE: 3 MMHG
ECHO LA DIAMETER INDEX: 2.08 CM/M2
ECHO LA DIAMETER: 3.5 CM
ECHO LA VOL A-L A2C: 67 ML (ref 22–52)
ECHO LA VOL A-L A4C: 48 ML (ref 22–52)
ECHO LA VOL BP: 58 ML (ref 22–52)
ECHO LA VOL MOD A2C: 63 ML (ref 22–52)
ECHO LA VOL MOD A4C: 46 ML (ref 22–52)
ECHO LA VOL/BSA BIPLANE: 35 ML/M2 (ref 16–34)
ECHO LA VOLUME AREA LENGTH: 62 ML
ECHO LA VOLUME INDEX A-L A2C: 40 ML/M2 (ref 16–34)
ECHO LA VOLUME INDEX A-L A4C: 29 ML/M2 (ref 16–34)
ECHO LA VOLUME INDEX AREA LENGTH: 37 ML/M2 (ref 16–34)
ECHO LA VOLUME INDEX MOD A2C: 38 ML/M2 (ref 16–34)
ECHO LA VOLUME INDEX MOD A4C: 27 ML/M2 (ref 16–34)
ECHO LV EJECTION FRACTION BIPLANE: 60 % (ref 55–100)
ECHO LV FRACTIONAL SHORTENING: 38 % (ref 28–44)
ECHO LV INTERNAL DIMENSION DIASTOLE INDEX: 2.98 CM/M2
ECHO LV INTERNAL DIMENSION DIASTOLIC: 5 CM (ref 3.9–5.3)
ECHO LV INTERNAL DIMENSION SYSTOLIC INDEX: 1.85 CM/M2
ECHO LV INTERNAL DIMENSION SYSTOLIC: 3.1 CM
ECHO LV IVSD: 0.8 CM (ref 0.6–0.9)
ECHO LV IVSS: 1.2 CM
ECHO LV MASS 2D: 146.8 G (ref 67–162)
ECHO LV MASS INDEX 2D: 87.4 G/M2 (ref 43–95)
ECHO LV POSTERIOR WALL DIASTOLIC: 0.9 CM (ref 0.6–0.9)
ECHO LV POSTERIOR WALL SYSTOLIC: 1 CM
ECHO LV RELATIVE WALL THICKNESS RATIO: 0.36
ECHO LVOT AREA: 3.1 CM2
ECHO LVOT AV VTI INDEX: 0.66
ECHO LVOT DIAM: 2 CM
ECHO LVOT MEAN GRADIENT: 4 MMHG
ECHO LVOT PEAK GRADIENT: 7 MMHG
ECHO LVOT PEAK VELOCITY: 1.3 M/S
ECHO LVOT STROKE VOLUME INDEX: 51.8 ML/M2
ECHO LVOT SV: 87 ML
ECHO LVOT VTI: 27.7 CM
ECHO MV "A" WAVE DURATION: 145.6 MSEC
ECHO MV A VELOCITY: 0.78 M/S
ECHO MV AREA PHT: 2.3 CM2
ECHO MV AREA VTI: 2.4 CM2
ECHO MV E DECELERATION TIME (DT): 292.7 MS
ECHO MV E VELOCITY: 0.79 M/S
ECHO MV E/A RATIO: 1.01
ECHO MV LVOT VTI INDEX: 1.34
ECHO MV MAX VELOCITY: 1.1 M/S
ECHO MV MEAN GRADIENT: 2 MMHG
ECHO MV MEAN VELOCITY: 0.7 M/S
ECHO MV PEAK GRADIENT: 4 MMHG
ECHO MV PRESSURE HALF TIME (PHT): 96.5 MS
ECHO MV VTI: 37 CM
ECHO PV MAX VELOCITY: 1.1 M/S
ECHO PV MEAN GRADIENT: 3 MMHG
ECHO PV MEAN VELOCITY: 0.8 M/S
ECHO PV PEAK GRADIENT: 5 MMHG
ECHO PV VTI: 21.9 CM
ECHO PVEIN A DURATION: 148.4 MS
ECHO PVEIN A VELOCITY: 0.3 M/S
ECHO PVEIN PEAK D VELOCITY: 0.6 M/S
ECHO PVEIN PEAK S VELOCITY: 0.7 M/S
ECHO PVEIN S/D RATIO: 1.2
ECHO RIGHT VENTRICULAR SYSTOLIC PRESSURE (RVSP): 37 MMHG
ECHO RV INTERNAL DIMENSION: 3.7 CM
ECHO TV REGURGITANT MAX VELOCITY: 2.9 M/S
ECHO TV REGURGITANT PEAK GRADIENT: 34 MMHG
ERYTHROCYTE [DISTWIDTH] IN BLOOD BY AUTOMATED COUNT: 16.3 % (ref 11.5–15)
GFR, ESTIMATED: >90 ML/MIN/1.73M2
GLUCOSE SERPL-MCNC: 85 MG/DL (ref 74–99)
HCT VFR BLD AUTO: 27.3 % (ref 34–48)
HGB BLD-MCNC: 7.9 G/DL (ref 11.5–15.5)
MAGNESIUM SERPL-MCNC: 2.6 MG/DL (ref 1.6–2.6)
MCH RBC QN AUTO: 26.1 PG (ref 26–35)
MCHC RBC AUTO-ENTMCNC: 28.9 G/DL (ref 32–34.5)
MCV RBC AUTO: 90.1 FL (ref 80–99.9)
MICROORGANISM SPEC CULT: NORMAL
PHOSPHATE SERPL-MCNC: 4 MG/DL (ref 2.5–4.5)
PLATELET # BLD AUTO: 274 K/UL (ref 130–450)
PMV BLD AUTO: 10.8 FL (ref 7–12)
POTASSIUM SERPL-SCNC: 4.3 MMOL/L (ref 3.5–5)
PROT SERPL-MCNC: 6.4 G/DL (ref 6.4–8.3)
RBC # BLD AUTO: 3.03 M/UL (ref 3.5–5.5)
SODIUM SERPL-SCNC: 146 MMOL/L (ref 132–146)
SPECIMEN DESCRIPTION: NORMAL
WBC OTHER # BLD: 6.5 K/UL (ref 4.5–11.5)

## 2024-08-30 PROCEDURE — 80053 COMPREHEN METABOLIC PANEL: CPT

## 2024-08-30 PROCEDURE — 93306 TTE W/DOPPLER COMPLETE: CPT

## 2024-08-30 PROCEDURE — 83735 ASSAY OF MAGNESIUM: CPT

## 2024-08-30 PROCEDURE — 6360000002 HC RX W HCPCS

## 2024-08-30 PROCEDURE — 85027 COMPLETE CBC AUTOMATED: CPT

## 2024-08-30 PROCEDURE — 6370000000 HC RX 637 (ALT 250 FOR IP): Performed by: INTERNAL MEDICINE

## 2024-08-30 PROCEDURE — 6370000000 HC RX 637 (ALT 250 FOR IP)

## 2024-08-30 PROCEDURE — 93306 TTE W/DOPPLER COMPLETE: CPT | Performed by: INTERNAL MEDICINE

## 2024-08-30 PROCEDURE — 99232 SBSQ HOSP IP/OBS MODERATE 35: CPT | Performed by: INTERNAL MEDICINE

## 2024-08-30 PROCEDURE — 84100 ASSAY OF PHOSPHORUS: CPT

## 2024-08-30 PROCEDURE — 2060000000 HC ICU INTERMEDIATE R&B

## 2024-08-30 PROCEDURE — 2580000003 HC RX 258

## 2024-08-30 RX ORDER — HYDROCODONE BITARTRATE AND ACETAMINOPHEN 5; 325 MG/1; MG/1
1 TABLET ORAL EVERY 6 HOURS PRN
Status: DISCONTINUED | OUTPATIENT
Start: 2024-08-30 | End: 2024-08-31 | Stop reason: HOSPADM

## 2024-08-30 RX ADMIN — ATORVASTATIN CALCIUM 20 MG: 20 TABLET, FILM COATED ORAL at 20:20

## 2024-08-30 RX ADMIN — ACETAMINOPHEN 650 MG: 325 TABLET ORAL at 23:57

## 2024-08-30 RX ADMIN — DULOXETINE HYDROCHLORIDE 60 MG: 60 CAPSULE, DELAYED RELEASE ORAL at 08:21

## 2024-08-30 RX ADMIN — PREGABALIN 150 MG: 75 CAPSULE ORAL at 09:24

## 2024-08-30 RX ADMIN — ENOXAPARIN SODIUM 40 MG: 100 INJECTION SUBCUTANEOUS at 20:20

## 2024-08-30 RX ADMIN — METHOCARBAMOL TABLETS 500 MG: 500 TABLET, COATED ORAL at 09:24

## 2024-08-30 RX ADMIN — SODIUM CHLORIDE, PRESERVATIVE FREE 10 ML: 5 INJECTION INTRAVENOUS at 08:23

## 2024-08-30 RX ADMIN — PROPRANOLOL HYDROCHLORIDE 10 MG: 10 TABLET ORAL at 20:20

## 2024-08-30 RX ADMIN — SODIUM CHLORIDE, PRESERVATIVE FREE 10 ML: 5 INJECTION INTRAVENOUS at 20:22

## 2024-08-30 RX ADMIN — PREGABALIN 150 MG: 75 CAPSULE ORAL at 20:20

## 2024-08-30 RX ADMIN — ACETAMINOPHEN 650 MG: 325 TABLET ORAL at 14:16

## 2024-08-30 RX ADMIN — METHOCARBAMOL TABLETS 500 MG: 500 TABLET, COATED ORAL at 14:09

## 2024-08-30 RX ADMIN — HYDROCODONE BITARTRATE AND ACETAMINOPHEN 1 TABLET: 5; 325 TABLET ORAL at 17:58

## 2024-08-30 RX ADMIN — PREGABALIN 150 MG: 75 CAPSULE ORAL at 14:09

## 2024-08-30 RX ADMIN — PROPRANOLOL HYDROCHLORIDE 10 MG: 10 TABLET ORAL at 08:22

## 2024-08-30 RX ADMIN — ACETAMINOPHEN 650 MG: 325 TABLET ORAL at 08:20

## 2024-08-30 RX ADMIN — METHOCARBAMOL TABLETS 500 MG: 500 TABLET, COATED ORAL at 20:20

## 2024-08-30 ASSESSMENT — PAIN DESCRIPTION - LOCATION
LOCATION: HEAD

## 2024-08-30 ASSESSMENT — PAIN DESCRIPTION - ORIENTATION
ORIENTATION: RIGHT;LEFT
ORIENTATION: MID

## 2024-08-30 ASSESSMENT — PAIN SCALES - GENERAL
PAINLEVEL_OUTOF10: 4
PAINLEVEL_OUTOF10: 0
PAINLEVEL_OUTOF10: 0
PAINLEVEL_OUTOF10: 9
PAINLEVEL_OUTOF10: 0
PAINLEVEL_OUTOF10: 10
PAINLEVEL_OUTOF10: 4
PAINLEVEL_OUTOF10: 5
PAINLEVEL_OUTOF10: 10
PAINLEVEL_OUTOF10: 10

## 2024-08-30 ASSESSMENT — PAIN DESCRIPTION - DESCRIPTORS
DESCRIPTORS: ACHING;DISCOMFORT;DULL
DESCRIPTORS: ACHING;THROBBING;DISCOMFORT
DESCRIPTORS: ACHING;DISCOMFORT;DULL

## 2024-08-30 NOTE — PROGRESS NOTES
4 Eyes Skin Assessment     NAME:  Valerie Pulido  YOB: 1975  MEDICAL RECORD NUMBER:  [id number]    The patient is being assessed for  Transfer to New Unit    I agree that at least one RN has performed a thorough Head to Toe Skin Assessment on the patient. ALL assessment sites listed below have been assessed.      Areas assessed by both nurses:    Head, Face, Ears, Shoulders, Back, Chest, Arms, Elbows, Hands, Sacrum. Buttock, Coccyx, Ischium, Legs. Feet and Heels, and Under Medical Devices         Does the Patient have a Wound? No noted wound(s)       Andrea Prevention initiated by RN: Yes  Wound Care Orders initiated by RN: No    Pressure Injury (Stage 3,4, Unstageable, DTI, NWPT, and Complex wounds) if present, place Wound referral order by RN under : No    New Ostomies, if present place, Ostomy referral order under : No     Nurse 1 eSignature: Electronically signed by Hector Archer RN on 8/30/24 at 5:27 AM EDT    **SHARE this note so that the co-signing nurse can place an eSignature**    Nurse 2 eSignature: {Esignature:[id number]}

## 2024-08-30 NOTE — PROGRESS NOTES
[facility] hospitalist  Hospitalist Progress Note      SYNOPSIS: Patient admitted on 2024 for aphasia and weakness.  Noted to have stroke.  Received TNKase.  Admitted to the ICU.      SUBJECTIVE:    Hospital out of the ICU  Sitting up in chair  Feeling better        Stable overnight. No other overnight issues reported.   Temp (24hrs), Av.6 °F (36.4 °C), Min:97 °F (36.1 °C), Max:98.2 °F (36.8 °C)    DIET: ADULT DIET; Regular  CODE: Full Code    Intake/Output Summary (Last 24 hours) at 2024 1255  Last data filed at 2024 1225  Gross per 24 hour   Intake 5322.21 ml   Output 3551 ml   Net 1771.21 ml       OBJECTIVE:    /83   Pulse 70   Temp 97.9 °F (36.6 °C) (Temporal)   Resp 16   Ht 1.524 m (5')   Wt 70.7 kg (155 lb 13.8 oz)   SpO2 96%   BMI 30.44 kg/m²     General appearance: No apparent distress, appears stated age and cooperative.  HEENT:  Conjunctivae/corneas clear.   Neck: Supple. No jugular venous distention.   Respiratory: Clear to auscultation bilaterally, normal respiratory effort  Cardiovascular: Regular rate rhythm, normal S1-S2  Abdomen: Soft, nontender, nondistended  Musculoskeletal: No clubbing, cyanosis, no bilateral lower extremity edema. Brisk capillary refill.   Skin:  No rashes  on visible skin  Neurologic: awake, alert and following commands     ASSESSMENT:    Acute stroke status post TNKase  Depression by history  Hypertension  Dyslipidemia  History of seizures       PLAN:    MRI no stroke  CT results noted  Home once ok with neurology      DISPOSITION: Remains inpatient pending further workup    Medications:  REVIEWED DAILY      Infusion Medications    sodium chloride      sodium chloride Stopped (24 1104)     Scheduled Medications    atorvastatin  20 mg Oral Nightly    sodium chloride flush  5-40 mL IntraVENous 2 times per day    [Held by provider] amLODIPine  10 mg Oral Daily    DULoxetine  60 mg Oral Daily    methocarbamol  500 mg Oral TID    pregabalin  150 mg  Oral TID    propranolol  10 mg Oral BID    enoxaparin  40 mg SubCUTAneous Daily     PRN Meds: perflutren lipid microspheres, acetaminophen, sodium chloride flush, sodium chloride, dextrose bolus, Ubrogepant, sodium chloride flush, sodium chloride, ondansetron **OR** ondansetron, polyethylene glycol, hydrALAZINE, labetalol        Labs:     Recent Labs     08/28/24 1935 08/29/24 0456 08/30/24  0405   WBC 7.9 6.2 6.5   HGB 8.1* 7.2* 7.9*   HCT 26.9* 25.4* 27.3*    265 274       Recent Labs     08/28/24 1935 08/29/24 0456 08/30/24  0405    146 146   K 4.3 4.2 4.3   * 113* 110*   CO2 26 23 25   BUN 10 9 11   CREATININE 0.7 0.6 0.6   CALCIUM 10.2 9.9 10.2   PHOS  --   --  4.0       Recent Labs     08/28/24 1935 08/29/24 0456 08/30/24  0405   ALKPHOS 97 69 78   ALT 14 15 14   AST 25 33* 26   BILITOT <0.2 <0.2 <0.2       Recent Labs     08/28/24 1935   INR 0.9       No results for input(s): \"CKTOTAL\", \"TROPONINI\" in the last 72 hours.    Chronic labs:    Lab Results   Component Value Date    CHOL 168 08/29/2024    TRIG 151 (H) 08/29/2024    HDL 47 08/29/2024    TSH 0.55 07/09/2024    INR 0.9 08/28/2024    LABA1C 5.6 08/29/2024       Radiology: REVIEWED DAILY    +++++++++++++++++++++++++++++++++++++++++++++++++  MD Dorothy Grider- Hospitalist  [facility], [place]  +++++++++++++++++++++++++++++++++++++++++++++++++  NOTE: This report was transcribed using voice recognition software. Every effort was made to ensure accuracy; however, inadvertent computerized transcription errors may be present.

## 2024-08-30 NOTE — PROGRESS NOTES
Informed RN    was covering general neurology yesterday due to lack of general neurology coverage  MRI negative  For migraine management either OP follow up with neurology or contact general neurology team IF there is coverage today ??

## 2024-08-30 NOTE — PLAN OF CARE
Pt progressing towards all goals with interventions    Problem: Discharge Planning  Goal: Discharge to home or other facility with appropriate resources  Outcome: Progressing  Flowsheets (Taken 8/29/2024 2000)  Discharge to home or other facility with appropriate resources:   Identify barriers to discharge with patient and caregiver   Arrange for needed discharge resources and transportation as appropriate   Arrange for interpreters to assist at discharge as needed   Identify discharge learning needs (meds, wound care, etc)   Refer to discharge planning if patient needs post-hospital services based on physician order or complex needs related to functional status, cognitive ability or social support system     Problem: Pain  Goal: Verbalizes/displays adequate comfort level or baseline comfort level  8/30/2024 0055 by Conchita Alicea RN  Outcome: Progressing  Flowsheets  Taken 8/29/2024 2000 by Conchita Alicea RN  Verbalizes/displays adequate comfort level or baseline comfort level:   Encourage patient to monitor pain and request assistance   Assess pain using appropriate pain scale   Administer analgesics based on type and severity of pain and evaluate response   Consider cultural and social influences on pain and pain management   Implement non-pharmacological measures as appropriate and evaluate response   Notify Licensed Independent Practitioner if interventions unsuccessful or patient reports new pain  Taken 8/29/2024 1900 by Destiny Vigil RN  Verbalizes/displays adequate comfort level or baseline comfort level:   Encourage patient to monitor pain and request assistance   Assess pain using appropriate pain scale   Administer analgesics based on type and severity of pain and evaluate response   Implement non-pharmacological measures as appropriate and evaluate response  Taken 8/29/2024 1800 by Destiny Vigil RN  Verbalizes/displays adequate comfort level or baseline comfort level:    Encourage patient to monitor pain and request assistance   Assess pain using appropriate pain scale   Administer analgesics based on type and severity of pain and evaluate response   Implement non-pharmacological measures as appropriate and evaluate response  8/29/2024 1731 by Destiny Vigil RN  Outcome: Progressing  Flowsheets  Taken 8/29/2024 1700  Verbalizes/displays adequate comfort level or baseline comfort level:   Encourage patient to monitor pain and request assistance   Assess pain using appropriate pain scale   Administer analgesics based on type and severity of pain and evaluate response   Implement non-pharmacological measures as appropriate and evaluate response  Taken 8/29/2024 1600  Verbalizes/displays adequate comfort level or baseline comfort level:   Encourage patient to monitor pain and request assistance   Assess pain using appropriate pain scale   Administer analgesics based on type and severity of pain and evaluate response   Implement non-pharmacological measures as appropriate and evaluate response  Taken 8/29/2024 1500  Verbalizes/displays adequate comfort level or baseline comfort level:   Encourage patient to monitor pain and request assistance   Assess pain using appropriate pain scale   Administer analgesics based on type and severity of pain and evaluate response   Implement non-pharmacological measures as appropriate and evaluate response  Taken 8/29/2024 1400  Verbalizes/displays adequate comfort level or baseline comfort level:   Encourage patient to monitor pain and request assistance   Assess pain using appropriate pain scale   Administer analgesics based on type and severity of pain and evaluate response   Implement non-pharmacological measures as appropriate and evaluate response  8/29/2024 1306 by Destiny Vigil RN  Outcome: Progressing  Flowsheets  Taken 8/29/2024 1300  Verbalizes/displays adequate comfort level or baseline comfort level:   Encourage patient to monitor pain  Coping  Goal: Pt/Family able to verbalize concerns and demonstrate effective coping strategies  Description: INTERVENTIONS:  1. Assist patient/family to identify coping skills, available support systems and cultural and spiritual values  2. Provide emotional support, including active listening and acknowledgement of concerns of patient and caregivers  3. Reduce environmental stimuli, as able  4. Instruct patient/family in relaxation techniques, as appropriate  5. Assess for spiritual pain/suffering and initiate Spiritual Care, Psychosocial Clinical Specialist consults as needed  Outcome: Progressing  Flowsheets (Taken 8/29/2024 2000)  Patient/family able to verbalize anxieties, fears, and concerns, and demonstrate effective coping:   Assist patient/family to identify coping skills, available support systems and cultural and spiritual values   Provide emotional support, including active listening and acknowledgement of concerns of patient and caregivers   Reduce environmental stimuli, as able   Instruct patient/family in relaxation techniques, as appropriate   Assess for spiritual pain/suffering and initiate Spiritual Care, Psychosocial Clinical Specialist consults as needed

## 2024-08-30 NOTE — CARE COORDINATION
Received case in transfer, met with pt and spouse. Reviewed therapy notes 14/24 120 ft min-mod assist hha. Pt doesn't feel she can go home d/t they rent a room and there are multiple animals. Gave a list of [facility] for pt to review.Salome Farmer, MSW, LSW

## 2024-08-30 NOTE — PLAN OF CARE
Problem: Discharge Planning  Goal: Discharge to home or other facility with appropriate resources  8/30/2024 1217 by Bryan Cazares RN  Outcome: Progressing     Problem: Pain  Goal: Verbalizes/displays adequate comfort level or baseline comfort level  8/30/2024 1217 by Bryan Cazares RN  Outcome: Progressing     Problem: Safety - Adult  Goal: Free from fall injury  8/30/2024 1217 by Bryan Cazares RN  Outcome: Progressing     Problem: Neurosensory - Adult  Goal: Achieves stable or improved neurological status  8/30/2024 1217 by Bryan Cazares RN  Outcome: Progressing     Problem: Neurosensory - Adult  Goal: Absence of seizures  8/30/2024 1217 by Bryan Cazares RN  Outcome: Progressing     Problem: Neurosensory - Adult  Goal: Remains free of injury related to seizures activity  8/30/2024 1217 by Bryan Cazares RN  Outcome: Progressing     Problem: Neurosensory - Adult  Goal: Achieves maximal functionality and self care  8/30/2024 1217 by Bryan Cazares RN  Outcome: Progressing     Problem: Respiratory - Adult  Goal: Achieves optimal ventilation and oxygenation  8/30/2024 1217 by Bryan Cazares RN  Outcome: Progressing     Problem: Cardiovascular - Adult  Goal: Maintains optimal cardiac output and hemodynamic stability  8/30/2024 1217 by Bryan Cazares RN  Outcome: Progressing     Problem: Cardiovascular - Adult  Goal: Absence of cardiac dysrhythmias or at baseline  8/30/2024 1217 by Bryan Cazares RN  Outcome: Progressing     Problem: Skin/Tissue Integrity - Adult  Goal: Skin integrity remains intact  8/30/2024 1217 by Bryan Cazares RN  Outcome: Progressing  Flowsheets (Taken 8/30/2024 0800)  Skin Integrity Remains Intact: Monitor for areas of redness and/or skin breakdown

## 2024-08-30 NOTE — PROGRESS NOTES
Report called to [facility]. Transport Requested. The following patient belongings:  Glasses, Wallet, Purse, Cell Phone, and Cell Phone  were gathered and placed with patient on transfer upon transfer to [facility]. Family notified by phone, updated on new unit and room number by patient.

## 2024-08-30 NOTE — PROGRESS NOTES
Shift communication and events    1952 - Pt with episodes of bradycardia down to mid 40s. Pt remains hemodynamically stable with no s/s of distress during episodes. Dr. Thomas notified via secure message. RN requests MD notification criteria. Per MD, notify MD if patient becomes hypertensive and/or has neurological changes during bradycardia.

## 2024-08-30 NOTE — PROGRESS NOTES
Message sent to Dr. Mendoza was unsuccessful, messaged sent to Dr. Arshad regarding pts migrane, orders will follow.

## 2024-08-31 VITALS
HEART RATE: 62 BPM | DIASTOLIC BLOOD PRESSURE: 58 MMHG | WEIGHT: 155.87 LBS | OXYGEN SATURATION: 96 % | RESPIRATION RATE: 19 BRPM | SYSTOLIC BLOOD PRESSURE: 105 MMHG | TEMPERATURE: 97.4 F | HEIGHT: 60 IN | BODY MASS INDEX: 30.6 KG/M2

## 2024-08-31 PROCEDURE — 6370000000 HC RX 637 (ALT 250 FOR IP)

## 2024-08-31 PROCEDURE — 6370000000 HC RX 637 (ALT 250 FOR IP): Performed by: INTERNAL MEDICINE

## 2024-08-31 PROCEDURE — 99239 HOSP IP/OBS DSCHRG MGMT >30: CPT | Performed by: INTERNAL MEDICINE

## 2024-08-31 PROCEDURE — 2580000003 HC RX 258

## 2024-08-31 RX ORDER — PROPRANOLOL HCL 20 MG
20 TABLET ORAL 2 TIMES DAILY
Status: DISCONTINUED | OUTPATIENT
Start: 2024-08-31 | End: 2024-08-31 | Stop reason: HOSPADM

## 2024-08-31 RX ORDER — BUTALBITAL, ACETAMINOPHEN AND CAFFEINE 50; 325; 40 MG/1; MG/1; MG/1
1 TABLET ORAL EVERY 4 HOURS PRN
Qty: 6 TABLET | Refills: 3 | Status: SHIPPED | OUTPATIENT
Start: 2024-08-31

## 2024-08-31 RX ORDER — ATORVASTATIN CALCIUM 20 MG/1
20 TABLET, FILM COATED ORAL NIGHTLY
Qty: 30 TABLET | Refills: 3 | Status: SHIPPED | OUTPATIENT
Start: 2024-08-31

## 2024-08-31 RX ORDER — PROPRANOLOL HCL 10 MG
20 TABLET ORAL 2 TIMES DAILY
Qty: 60 TABLET | Refills: 3 | Status: SHIPPED | OUTPATIENT
Start: 2024-08-31

## 2024-08-31 RX ORDER — BUTALBITAL, ACETAMINOPHEN AND CAFFEINE 50; 325; 40 MG/1; MG/1; MG/1
1 TABLET ORAL EVERY 4 HOURS PRN
Status: DISCONTINUED | OUTPATIENT
Start: 2024-08-31 | End: 2024-08-31 | Stop reason: HOSPADM

## 2024-08-31 RX ADMIN — PREGABALIN 150 MG: 75 CAPSULE ORAL at 14:53

## 2024-08-31 RX ADMIN — METHOCARBAMOL TABLETS 500 MG: 500 TABLET, COATED ORAL at 14:11

## 2024-08-31 RX ADMIN — BUTALBITAL, ACETAMINOPHEN AND CAFFEINE 1 TABLET: 325; 50; 40 TABLET ORAL at 13:25

## 2024-08-31 RX ADMIN — METHOCARBAMOL TABLETS 500 MG: 500 TABLET, COATED ORAL at 04:56

## 2024-08-31 RX ADMIN — PREGABALIN 150 MG: 75 CAPSULE ORAL at 04:56

## 2024-08-31 RX ADMIN — DULOXETINE HYDROCHLORIDE 60 MG: 60 CAPSULE, DELAYED RELEASE ORAL at 04:56

## 2024-08-31 RX ADMIN — SODIUM CHLORIDE, PRESERVATIVE FREE 10 ML: 5 INJECTION INTRAVENOUS at 08:47

## 2024-08-31 RX ADMIN — ACETAMINOPHEN 650 MG: 325 TABLET ORAL at 08:53

## 2024-08-31 RX ADMIN — PROPRANOLOL HYDROCHLORIDE 10 MG: 10 TABLET ORAL at 04:56

## 2024-08-31 ASSESSMENT — PAIN - FUNCTIONAL ASSESSMENT
PAIN_FUNCTIONAL_ASSESSMENT: ACTIVITIES ARE NOT PREVENTED
PAIN_FUNCTIONAL_ASSESSMENT: ACTIVITIES ARE NOT PREVENTED

## 2024-08-31 ASSESSMENT — PAIN DESCRIPTION - LOCATION
LOCATION: HEAD

## 2024-08-31 ASSESSMENT — PAIN SCALES - GENERAL
PAINLEVEL_OUTOF10: 3
PAINLEVEL_OUTOF10: 0
PAINLEVEL_OUTOF10: 4
PAINLEVEL_OUTOF10: 3
PAINLEVEL_OUTOF10: 0
PAINLEVEL_OUTOF10: 5
PAINLEVEL_OUTOF10: 0

## 2024-08-31 ASSESSMENT — PAIN DESCRIPTION - ORIENTATION: ORIENTATION: RIGHT;LEFT

## 2024-08-31 ASSESSMENT — PAIN DESCRIPTION - ONSET
ONSET: ON-GOING
ONSET: ON-GOING

## 2024-08-31 ASSESSMENT — PAIN DESCRIPTION - PAIN TYPE
TYPE: ACUTE PAIN
TYPE: ACUTE PAIN

## 2024-08-31 ASSESSMENT — PAIN DESCRIPTION - DESCRIPTORS
DESCRIPTORS: ACHING;DISCOMFORT
DESCRIPTORS: ACHING;DISCOMFORT;THROBBING
DESCRIPTORS: ACHING;DISCOMFORT;SHARP

## 2024-08-31 ASSESSMENT — PAIN DESCRIPTION - FREQUENCY
FREQUENCY: CONTINUOUS
FREQUENCY: CONTINUOUS

## 2024-08-31 NOTE — PLAN OF CARE
Problem: Discharge Planning  Goal: Discharge to home or other facility with appropriate resources  8/30/2024 2103 by Tia Pereira RN  Outcome: Progressing  8/30/2024 1217 by Bryan Cazares RN  Outcome: Progressing     Problem: Pain  Goal: Verbalizes/displays adequate comfort level or baseline comfort level  8/30/2024 2103 by Tia Pereira RN  Outcome: Progressing  8/30/2024 1217 by Bryan Cazares RN  Outcome: Progressing     Problem: Safety - Adult  Goal: Free from fall injury  8/30/2024 2103 by Tia Pereira RN  Outcome: Progressing  8/30/2024 1217 by Bryan Cazares RN  Outcome: Progressing     Problem: Neurosensory - Adult  Goal: Achieves stable or improved neurological status  8/30/2024 2103 by Tia Pereira RN  Outcome: Progressing  8/30/2024 1217 by Bryan Cazares RN  Outcome: Progressing  Goal: Absence of seizures  8/30/2024 2103 by Tia Pereira RN  Outcome: Progressing  8/30/2024 1217 by Bryan Cazares RN  Outcome: Progressing  Goal: Remains free of injury related to seizures activity  8/30/2024 2103 by Tia Pereira RN  Outcome: Progressing  8/30/2024 1217 by Bryan Cazares RN  Outcome: Progressing  Goal: Achieves maximal functionality and self care  8/30/2024 2103 by Tia Pereira RN  Outcome: Progressing  8/30/2024 1217 by Bryan Cazares RN  Outcome: Progressing     Problem: Respiratory - Adult  Goal: Achieves optimal ventilation and oxygenation  8/30/2024 2103 by Tia Pereira RN  Outcome: Progressing  8/30/2024 1217 by Bryan Cazares RN  Outcome: Progressing     Problem: Cardiovascular - Adult  Goal: Maintains optimal cardiac output and hemodynamic stability  8/30/2024 2103 by Tia Pereira RN  Outcome: Progressing  8/30/2024 1217 by Bryan Cazares RN  Outcome: Progressing  Goal: Absence of cardiac dysrhythmias or at baseline  8/30/2024 2103 by Tia Pereira, RN  Outcome: Progressing  8/30/2024 1217 by Bryan Cazares,  RN  Outcome: Progressing     Problem: Skin/Tissue Integrity - Adult  Goal: Skin integrity remains intact  8/30/2024 2103 by Tia Pereira RN  Outcome: Progressing  Flowsheets (Taken 8/30/2024 2000)  Skin Integrity Remains Intact: Monitor for areas of redness and/or skin breakdown  8/30/2024 1217 by Bryan Cazares RN  Outcome: Progressing  Flowsheets (Taken 8/30/2024 0800)  Skin Integrity Remains Intact: Monitor for areas of redness and/or skin breakdown     Problem: Musculoskeletal - Adult  Goal: Return mobility to safest level of function  8/30/2024 2103 by Tia Pereira RN  Outcome: Progressing  8/30/2024 1217 by Bryan Cazares RN  Outcome: Progressing     Problem: Metabolic/Fluid and Electrolytes - Adult  Goal: Electrolytes maintained within normal limits  8/30/2024 2103 by Tia Pereira RN  Outcome: Progressing  8/30/2024 1217 by Bryan Cazares RN  Outcome: Progressing  Goal: Hemodynamic stability and optimal renal function maintained  8/30/2024 2103 by Tia Pereira RN  Outcome: Progressing  8/30/2024 1217 by Bryan Cazares RN  Outcome: Progressing     Problem: Hematologic - Adult  Goal: Maintains hematologic stability  8/30/2024 2103 by Tia Pereira RN  Outcome: Progressing  8/30/2024 1217 by Bryan Cazares RN  Outcome: Progressing     Problem: Genitourinary - Adult  Goal: Absence of urinary retention  8/30/2024 2103 by Tia Pereira RN  Outcome: Progressing  8/30/2024 1217 by Bryan Cazares RN  Outcome: Progressing     Problem: Anxiety  Goal: Will report anxiety at manageable levels  Description: INTERVENTIONS:  1. Administer medication as ordered  2. Teach and rehearse alternative coping skills  3. Provide emotional support with 1:1 interaction with staff  8/30/2024 2103 by Tia Pereira RN  Outcome: Progressing  8/30/2024 1217 by Bryan Cazares RN  Outcome: Progressing     Problem: Coping  Goal: Pt/Family able to verbalize concerns and demonstrate

## 2024-08-31 NOTE — CARE COORDINATION
Discharge order noted, met with pt, pt states she is going home and her daughter will be picking pt up. Notified charge RN, no needs.Salome Farmer, MSW, LSW

## 2024-08-31 NOTE — DISCHARGE INSTRUCTIONS
Your information:  Name: Valerie Pulido  : 1975    Your instructions:    ***    What to do after you leave the hospital:    Recommended diet: regular diet    Recommended activity: activity as tolerated        The following personal items were collected during your admission and were returned to you:         Information obtained by:  By signing below, I understand that if any problems occur once I leave the hospital I am to contact Dr. Bhagat.  I understand and acknowledge receipt of the instructions indicated above.

## 2024-08-31 NOTE — DISCHARGE SUMMARY
Patient ID: Valerie Pulido      Patient's PCP: Shahram Bhagat DO    Admit Date: 8/28/2024     Discharge Date:   8/31/2024    Admitting Physician: Jean Carlos Lawson MD     Discharge Physician: Maryam Weston MD     Discharge Diagnoses:       Active Hospital Problems    Diagnosis Date Noted    Stroke-like episode [R29.90] 08/29/2024    Cerebrovascular accident (CVA) (HCC) [I63.9] 08/29/2024    Aphasia determined by examination [R47.01] 08/29/2024    Acute cerebrovascular accident (CVA) (HCC) [I63.9] 08/28/2024       The patient was seen and examined on day of discharge and this discharge summary is in conjunction with any daily progress note from day of discharge.    Hospital Course:   Patient admitted on 8/28/2024      This is 49-year-old female with no significant past medical history was not taking any medication brought in here by her  due to sudden onset of aphasia and weakness of her right side of the body.  Her onset was definite and  mentioned she was known well on 1753.  As per ER physician note patient suddenly developed aphasia and her right side of the body was not working.  Stroke alert was initiated CT scan and CTA did not show any abnormalities.  Patient received TNK and transferred to neuro ICU.  During my encounter with patient could not remember much history history taken from ER physician.  Currently she does not have any weakness or aphasia.  Scanning did not show any acute events.  Patient is likely having a complex migrai        Exam:     /63   Pulse 67   Temp 97.6 °F (36.4 °C) (Temporal)   Resp 17   Ht 1.524 m (5')   Wt 70.7 kg (155 lb 13.8 oz)   SpO2 99%   BMI 30.44 kg/m²     General appearance: No apparent distress, appears stated age and cooperative.  HEENT: Pupils equal, round, and reactive to light. Conjunctivae/corneas clear.  Neck: Supple, with full range of motion. No jugular venous distention. Trachea midline.  Respiratory:  Normal respiratory effort.  duplex carotid bilateral    Result Date: 8/21/2024  The right internal carotid artery demonstrates 0-50% stenosis. The left internal carotid artery demonstrates 0-50% stenosis. Bilateral vertebral arteries are patent with flow in the normal direction.       Disposition: Stable for home    Discharge Instructions/Follow-up: Given    Code Status:  Full Code     Activity: activity as tolerated    Diet: regular diet    Labs: For convenience and continuity at follow-up the following most recent labs are provided:      CBC:    Lab Results   Component Value Date/Time    WBC 6.5 08/30/2024 04:05 AM    HGB 7.9 08/30/2024 04:05 AM    HCT 27.3 08/30/2024 04:05 AM     08/30/2024 04:05 AM       Renal:    Lab Results   Component Value Date/Time     08/30/2024 04:05 AM    K 4.3 08/30/2024 04:05 AM     08/30/2024 04:05 AM    CO2 25 08/30/2024 04:05 AM    BUN 11 08/30/2024 04:05 AM    CREATININE 0.6 08/30/2024 04:05 AM    CALCIUM 10.2 08/30/2024 04:05 AM    PHOS 4.0 08/30/2024 04:05 AM       Discharge Medications:     Current Discharge Medication List             Details   atorvastatin (LIPITOR) 20 MG tablet Take 1 tablet by mouth nightly  Qty: 30 tablet, Refills: 3      butalbital-acetaminophen-caffeine (FIORICET, ESGIC) -40 MG per tablet Take 1 tablet by mouth every 4 hours as needed for Headaches Max Daily Amount: 6 tablets  Qty: 6 tablet, Refills: 3    Associated Diagnoses: Essential hypertension                Details   propranolol (INDERAL) 10 MG tablet Take 2 tablets by mouth 2 times daily  Qty: 60 tablet, Refills: 3                Details   pregabalin (LYRICA) 150 MG capsule Take 1 capsule by mouth in the morning, at noon, and at bedtime for 90 days. Max Daily Amount: 450 mg  Qty: 90 capsule, Refills: 2    Associated Diagnoses: Chronic pain syndrome; Cervicalgia; Myalgia; Fibromyalgia      aspirin 81 MG EC tablet Take 1 tablet by mouth daily  Qty: 90 tablet, Refills: 1      DULoxetine (CYMBALTA) 60 MG  extended release capsule Take 1 capsule by mouth daily  Qty: 30 capsule, Refills: 1    Associated Diagnoses: Anxiety and depression      fluticasone (FLONASE) 50 MCG/ACT nasal spray 1 spray by Each Nostril route daily  Qty: 16 g, Refills: 0    Associated Diagnoses: Nasal congestion      Erenumab-aooe (AIMOVIG) 140 MG/ML SOAJ Inject 140 mg into the skin every 28 days  Qty: 1 mL, Refills: 3      Ubrogepant (UBRELVY) 100 MG TABS Take 100 mg by mouth as needed (migraine)  Qty: 8 tablet, Refills: 3      methocarbamol (ROBAXIN) 500 MG tablet Take 1 tablet by mouth 3 times daily  Qty: 90 tablet, Refills: 3      RA LAXATIVE 17 GM/SCOOP powder Take 17 g by mouth daily      NONFORMULARY Edibles for shoulder, arm pain; bones in left leg             Time Spent on discharge is 33 minutes with time spent in the examination, evaluation, counseling and review of medications and discharge plan.      Signed:    Maryam Weston MD   8/31/2024

## 2024-08-31 NOTE — PROGRESS NOTES
Synopsis: Patient admitted on 2024     This is 49-year-old female with no significant past medical history was not taking any medication brought in here by her  due to sudden onset of aphasia and weakness of her right side of the body.  Her onset was definite and  mentioned she was known well on 1753.  As per ER physician note patient suddenly developed aphasia and her right side of the body was not working.  Stroke alert was initiated CT scan and CTA did not show any abnormalities.  Patient received TNK and transferred to neuro ICU.  During my encounter with patient could not remember much history history taken from ER physician.  Currently she does not have any weakness or aphasia.  Scanning did not show any acute events.  Patient is likely having a complex migraine.      Past Medical History:   has a past medical history of Acute cerebrovascular accident (CVA) (Formerly [facility]), Chest pain, Chronic back pain, Chronic neck pain, Depression, DJD (degenerative joint disease) of lumbar spine, Dyslipidemia, Essential hypertension, Grand mal seizure (HCC), Headache, History of cardiovascular stress test, Hypoglycemic syndrome, Nausea & vomiting, Ovarian cyst, Seizure disorder (Formerly [facility]), Spina bifida (HCC), and Stroke-like episode.    Past Surgical History:   Past Surgical History:   Procedure Laterality Date    BACK SURGERY       SECTION      CHOLECYSTECTOMY, LAPAROSCOPIC  3/11/16    with IOC    COLONOSCOPY      ECHO COMPL W DOP COLOR FLOW  2012         ENDOSCOPY, COLON, DIAGNOSTIC      GASTRIC BYPASS SURGERY      HYSTERECTOMY (CERVIX STATUS UNKNOWN)      KNEE ARTHROSCOPY Right 1984    x4 or 5    NERVE BLOCK  13    LUMBAR EPIDURAL #1    NERVE BLOCK      lumbar epidural    NERVE BLOCK  2013    lumbar epidural #3    PARTIAL HYSTERECTOMY (CERVIX NOT REMOVED)      STAN-EN-Y GASTRIC BYPASS  2004    TONSILLECTOMY AND ADENOIDECTOMY  1977    TUBAL LIGATION  2001       Social  History:   reports that she has been smoking cigarettes. She started smoking about 11 years ago. She has a 11.7 pack-year smoking history. She has never used smokeless tobacco. She reports current drug use. Drug: Marijuana (Weed). She reports that she does not drink alcohol.     Family History:   Family History   Problem Relation Age of Onset    Diabetes Mother     Heart Attack Mother 55    Heart Attack Father         young    Cancer Maternal Aunt         breast, uterine, cervical    Heart Attack Maternal Uncle         3     Heart Disease Other        Subjective    Clinically improving. Feeling better.    Stable overnight. No other overnight issues reported.   Other than the headache she is feeling well.    Exam:  /63   Pulse 67   Temp 97.6 °F (36.4 °C) (Temporal)   Resp 17   Ht 1.524 m (5')   Wt 70.7 kg (155 lb 13.8 oz)   SpO2 99%   BMI 30.44 kg/m²   General appearance: No apparent distress, appears stated age and cooperative.  HEENT: Pupils equal, round, and reactive to light. Conjunctivae/corneas clear.  Neck: Supple. No jugular venous distention. Trachea midline.  Respiratory:  Normal respiratory effort. Clear to auscultation, bilaterally without Rales/Wheezes/Rhonchi.  Cardiovascular: Regular rate and rhythm with normal S1/S2 without murmurs, rubs or gallops.  Abdomen: Soft, non-tender, non-distended with normal bowel sounds.  Musculoskeletal: No clubbing, cyanosis or edema bilaterally. Brisk capillary refill. 2+ lower extremity pulses (dorsalis pedis).   Skin:  No rashes    Neurologic: awake, alert and following commands     Medications:  Reviewed    Infusion Medications    sodium chloride      sodium chloride Stopped (24 1104)     Scheduled Medications    propranolol  20 mg Oral BID    atorvastatin  20 mg Oral Nightly    sodium chloride flush  5-40 mL IntraVENous 2 times per day    [Held by provider] amLODIPine  10 mg Oral Daily    DULoxetine  60 mg Oral Daily    methocarbamol  500 mg  management for her anemia.  Blood pressure looks okay.  Her heart rate is still stable and she could tolerate an upward titration of propranolol.  She admits to drinking a lot of caffeine.  Some of the headache she is having likely could be caffeine withdrawal given that she is in the hospital and there may be limitation to the amount she drinks versus what she is getting.  Can discontinue her amlodipine for now since her propranolol has been increased.  It can work both as a prophylaxis against migraines as well as antihypertensive.  Premorbid pregabalin can continue        Diet: ADULT DIET; Regular  Code Status: Full Code  PT/OT Eval Status:   Up and about in the room  DVT Prophylaxis:   In place  Recommended disposition at discharge: Home later today    +++++++++++++++++++++++++++++++++++++++++++++++++  Maryam Weston MD   [facility].  +++++++++++++++++++++++++++++++++++++++++++++++++  NOTE: This report was transcribed using voice recognition software. Every effort was made to ensure accuracy; however, inadvertent computerized transcription errors may be present.

## 2024-09-05 DIAGNOSIS — F32.A ANXIETY AND DEPRESSION: ICD-10-CM

## 2024-09-05 DIAGNOSIS — F41.9 ANXIETY AND DEPRESSION: ICD-10-CM

## 2024-09-05 RX ORDER — DULOXETIN HYDROCHLORIDE 60 MG/1
60 CAPSULE, DELAYED RELEASE ORAL DAILY
Qty: 90 CAPSULE | Refills: 1 | Status: SHIPPED | OUTPATIENT
Start: 2024-09-05

## 2024-09-05 NOTE — TELEPHONE ENCOUNTER
Requested Prescriptions     Pending Prescriptions Disp Refills    DULoxetine (CYMBALTA) 60 MG extended release capsule [Pharmacy Med Name: DULoxetine HCl 60 MG Oral Capsule Delayed Release Particles] 90 capsule 1     Sig: Take 1 capsule by mouth once daily       Next appt is 11/19/2024  Last appt was 7/16/2024

## 2024-09-12 ENCOUNTER — TELEPHONE (OUTPATIENT)
Age: 49
End: 2024-09-12

## 2024-09-16 DIAGNOSIS — I63.10 CEREBROVASCULAR ACCIDENT (CVA) DUE TO EMBOLISM OF PRECEREBRAL ARTERY (HCC): ICD-10-CM

## 2024-09-26 NOTE — PROGRESS NOTES
Physician Progress Note      PATIENT:               GABY WARD  CSN #:                  [id number]  :                       1975  ADMIT DATE:       2024 7:51 PM  DISCH DATE:        2024 6:36 PM  RESPONDING  PROVIDER #:        Maryam Weston MD          QUERY TEXT:    Pt admitted with concern for CVA with bilateral leg/arm weakness and unable to   speak. Pt. was administered TNKase in the ER on arrival. Pt noted to have   documentation of \"complex migraine\" documented by Dr. Weston on 2024.  If possible, please document in progress notes and discharge summary the   relationship, if any, between weakness and unable to speak and etiology:      The medical record reflects the following:  Risk Factors: Past history of CVA, HTN, HLD, Seizures  Clinical Indicators: Pt. presented for weakness and unable to speak.  Pt. had   immediate Head CT, and was given TNKase in the ER on arrival. Noted   documentation of \"Currently she does not have any weakness or aphasia\" and   \"patient is likely having a complex migraine\" by Dr. Weston on 24. MRI   noted\" mild ChiariI malformation\".  Treatment: TNKAse, PT/OT, Head CT, Brain MRI,    Jennifer Oscar, RN-BSN, CRCR  Clinical   [email or web address]  Options provided:  -- Weakness and Aphasia due to CVA  -- After study, Weakness and Aphasia due to complex migraine and CVA was ruled   out.  -- After study, weakness and aphasia due to CVA and complex migraine.  -- Other - I will add my own diagnosis  -- Disagree - Not applicable / Not valid  -- Disagree - Clinically unable to determine / Unknown  -- Refer to Clinical Documentation Reviewer    PROVIDER RESPONSE TEXT:    After study, Weakness and Aphasia due to complex migraine and CVA was ruled   out.    Query created by: Hazel Oscar on 2024 11:49 AM      Electronically signed by:  Maryam Weston MD 2024 8:51 PM

## 2024-09-27 ENCOUNTER — APPOINTMENT (OUTPATIENT)
Dept: CT IMAGING | Age: 49
End: 2024-09-27
Payer: COMMERCIAL

## 2024-09-27 ENCOUNTER — HOSPITAL ENCOUNTER (EMERGENCY)
Age: 49
Discharge: HOME | End: 2024-09-27
Attending: STUDENT IN AN ORGANIZED HEALTH CARE EDUCATION/TRAINING PROGRAM
Payer: COMMERCIAL

## 2024-09-27 VITALS
HEART RATE: 64 BPM | OXYGEN SATURATION: 97 % | DIASTOLIC BLOOD PRESSURE: 68 MMHG | SYSTOLIC BLOOD PRESSURE: 132 MMHG | RESPIRATION RATE: 14 BRPM | TEMPERATURE: 98 F

## 2024-09-27 DIAGNOSIS — R41.82 ALTERED MENTAL STATUS, UNSPECIFIED ALTERED MENTAL STATUS TYPE: Primary | ICD-10-CM

## 2024-09-27 LAB
ALBUMIN SERPL-MCNC: 3.7 G/DL (ref 3.5–5.2)
ALP SERPL-CCNC: 81 U/L (ref 35–104)
ALT SERPL-CCNC: 11 U/L (ref 0–32)
ANION GAP SERPL CALCULATED.3IONS-SCNC: 13 MMOL/L (ref 7–16)
AST SERPL-CCNC: 24 U/L (ref 0–31)
BACTERIA URNS QL MICRO: ABNORMAL
BASOPHILS # BLD: 0.08 K/UL (ref 0–0.2)
BASOPHILS NFR BLD: 1 % (ref 0–2)
BILIRUB SERPL-MCNC: 0.2 MG/DL (ref 0–1.2)
BILIRUB UR QL STRIP: NEGATIVE
BUN SERPL-MCNC: 12 MG/DL (ref 6–20)
CALCIUM SERPL-MCNC: 9.1 MG/DL (ref 8.6–10.2)
CHLORIDE SERPL-SCNC: 109 MMOL/L (ref 98–107)
CLARITY UR: CLEAR
CO2 SERPL-SCNC: 20 MMOL/L (ref 22–29)
COLOR UR: YELLOW
CREAT SERPL-MCNC: 0.6 MG/DL (ref 0.5–1)
EKG ATRIAL RATE: 72 BPM
EKG P AXIS: 48 DEGREES
EKG P-R INTERVAL: 178 MS
EKG Q-T INTERVAL: 424 MS
EKG QRS DURATION: 150 MS
EKG QTC CALCULATION (BAZETT): 464 MS
EKG R AXIS: 41 DEGREES
EKG T AXIS: 31 DEGREES
EKG VENTRICULAR RATE: 72 BPM
EOSINOPHIL # BLD: 0.23 K/UL (ref 0.05–0.5)
EOSINOPHILS RELATIVE PERCENT: 2 % (ref 0–6)
EPI CELLS #/AREA URNS HPF: ABNORMAL /HPF
ERYTHROCYTE [DISTWIDTH] IN BLOOD BY AUTOMATED COUNT: 17.9 % (ref 11.5–15)
GFR, ESTIMATED: >90 ML/MIN/1.73M2
GLUCOSE SERPL-MCNC: 75 MG/DL (ref 74–99)
GLUCOSE UR STRIP-MCNC: NEGATIVE MG/DL
HCT VFR BLD AUTO: 26 % (ref 34–48)
HGB BLD-MCNC: 7.3 G/DL (ref 11.5–15.5)
HGB UR QL STRIP.AUTO: NEGATIVE
IMM GRANULOCYTES # BLD AUTO: 0.03 K/UL (ref 0–0.58)
IMM GRANULOCYTES NFR BLD: 0 % (ref 0–5)
KETONES UR STRIP-MCNC: NEGATIVE MG/DL
LACTATE BLDV-SCNC: 0.9 MMOL/L (ref 0.5–2.2)
LEUKOCYTE ESTERASE UR QL STRIP: NEGATIVE
LYMPHOCYTES NFR BLD: 2.3 K/UL (ref 1.5–4)
LYMPHOCYTES RELATIVE PERCENT: 24 % (ref 20–42)
MCH RBC QN AUTO: 22.5 PG (ref 26–35)
MCHC RBC AUTO-ENTMCNC: 28.1 G/DL (ref 32–34.5)
MCV RBC AUTO: 80 FL (ref 80–99.9)
MONOCYTES NFR BLD: 0.62 K/UL (ref 0.1–0.95)
MONOCYTES NFR BLD: 6 % (ref 2–12)
NEUTROPHILS NFR BLD: 66 % (ref 43–80)
NEUTS SEG NFR BLD: 6.45 K/UL (ref 1.8–7.3)
NITRITE UR QL STRIP: POSITIVE
PH UR STRIP: 6 [PH] (ref 5–9)
PLATELET # BLD AUTO: 208 K/UL (ref 130–450)
PMV BLD AUTO: 10.5 FL (ref 7–12)
POTASSIUM SERPL-SCNC: 4 MMOL/L (ref 3.5–5)
PROT SERPL-MCNC: 5.9 G/DL (ref 6.4–8.3)
PROT UR STRIP-MCNC: NEGATIVE MG/DL
RBC # BLD AUTO: 3.25 M/UL (ref 3.5–5.5)
RBC # BLD: ABNORMAL 10*6/UL
RBC #/AREA URNS HPF: ABNORMAL /HPF
SODIUM SERPL-SCNC: 142 MMOL/L (ref 132–146)
SP GR UR STRIP: 1.01 (ref 1–1.03)
TROPONIN I SERPL HS-MCNC: <6 NG/L (ref 0–9)
TROPONIN I SERPL HS-MCNC: <6 NG/L (ref 0–9)
UROBILINOGEN UR STRIP-ACNC: 0.2 EU/DL (ref 0–1)
WBC #/AREA URNS HPF: ABNORMAL /HPF
WBC OTHER # BLD: 9.7 K/UL (ref 4.5–11.5)

## 2024-09-27 PROCEDURE — 72125 CT NECK SPINE W/O DYE: CPT

## 2024-09-27 PROCEDURE — 85025 COMPLETE CBC W/AUTO DIFF WBC: CPT

## 2024-09-27 PROCEDURE — 81001 URINALYSIS AUTO W/SCOPE: CPT

## 2024-09-27 PROCEDURE — 2580000003 HC RX 258

## 2024-09-27 PROCEDURE — 83605 ASSAY OF LACTIC ACID: CPT

## 2024-09-27 PROCEDURE — 99284 EMERGENCY DEPT VISIT MOD MDM: CPT

## 2024-09-27 PROCEDURE — 80053 COMPREHEN METABOLIC PANEL: CPT

## 2024-09-27 PROCEDURE — 84484 ASSAY OF TROPONIN QUANT: CPT

## 2024-09-27 PROCEDURE — 96374 THER/PROPH/DIAG INJ IV PUSH: CPT

## 2024-09-27 PROCEDURE — 6360000002 HC RX W HCPCS

## 2024-09-27 PROCEDURE — 70450 CT HEAD/BRAIN W/O DYE: CPT

## 2024-09-27 PROCEDURE — 96375 TX/PRO/DX INJ NEW DRUG ADDON: CPT

## 2024-09-27 PROCEDURE — 93005 ELECTROCARDIOGRAM TRACING: CPT

## 2024-09-27 RX ORDER — DIPHENHYDRAMINE HYDROCHLORIDE 50 MG/ML
25 INJECTION INTRAMUSCULAR; INTRAVENOUS ONCE
Status: COMPLETED | OUTPATIENT
Start: 2024-09-27 | End: 2024-09-27

## 2024-09-27 RX ORDER — METOCLOPRAMIDE HYDROCHLORIDE 5 MG/ML
10 INJECTION INTRAMUSCULAR; INTRAVENOUS ONCE
Status: COMPLETED | OUTPATIENT
Start: 2024-09-27 | End: 2024-09-27

## 2024-09-27 RX ORDER — 0.9 % SODIUM CHLORIDE 0.9 %
1000 INTRAVENOUS SOLUTION INTRAVENOUS ONCE
Status: COMPLETED | OUTPATIENT
Start: 2024-09-27 | End: 2024-09-27

## 2024-09-27 RX ADMIN — METOCLOPRAMIDE 10 MG: 5 INJECTION, SOLUTION INTRAMUSCULAR; INTRAVENOUS at 15:46

## 2024-09-27 RX ADMIN — SODIUM CHLORIDE 1000 ML: 9 INJECTION, SOLUTION INTRAVENOUS at 15:46

## 2024-09-27 RX ADMIN — DIPHENHYDRAMINE HYDROCHLORIDE 25 MG: 50 INJECTION INTRAMUSCULAR; INTRAVENOUS at 15:46

## 2024-09-27 ASSESSMENT — ENCOUNTER SYMPTOMS
RHINORRHEA: 0
VOMITING: 0
BACK PAIN: 0
COUGH: 0
CHEST TIGHTNESS: 0
ABDOMINAL PAIN: 0
DIARRHEA: 0
NAUSEA: 0
SORE THROAT: 0
SHORTNESS OF BREATH: 0
PHOTOPHOBIA: 0

## 2024-09-27 ASSESSMENT — PAIN DESCRIPTION - LOCATION: LOCATION: HEAD

## 2024-09-27 ASSESSMENT — PAIN SCALES - GENERAL: PAINLEVEL_OUTOF10: 10

## 2024-09-27 ASSESSMENT — PAIN DESCRIPTION - ONSET: ONSET: ON-GOING

## 2024-09-27 ASSESSMENT — PAIN DESCRIPTION - PAIN TYPE: TYPE: ACUTE PAIN

## 2024-09-27 ASSESSMENT — PAIN DESCRIPTION - FREQUENCY: FREQUENCY: CONTINUOUS

## 2024-09-27 ASSESSMENT — PAIN DESCRIPTION - DESCRIPTORS: DESCRIPTORS: POUNDING

## 2024-09-27 ASSESSMENT — PAIN - FUNCTIONAL ASSESSMENT: PAIN_FUNCTIONAL_ASSESSMENT: 0-10

## 2024-09-27 NOTE — DISCHARGE INSTRUCTIONS
Return if any new or worsening symptoms.  Return if any chest pain or shortness of breath.  Follow-up with your primary care provider.

## 2024-09-28 NOTE — ED PROVIDER NOTES
[facility] EMERGENCY DEPARTMENT  EMERGENCY DEPARTMENT ENCOUNTER        Pt Name: Valerie Pulido  MRN: [id number]  Birthdate 1975  Date of evaluation: 9/27/2024  Provider: Phil Isaac DO  PCP: Shahram Bhagat DO  Note Started: 11:36 PM EDT 9/27/24    CHIEF COMPLAINT       Chief Complaint   Patient presents with    Seizures     Per family seizure like activity, ems arrived pseudo seizure, per patient tumor in head, unknown LOC, 10/10 headache \"always get headaches d/t tumor, hx CVA,        HISTORY OF PRESENT ILLNESS: 1 or more Elements   History From: Patient      Valerie Pulido is a 49 y.o. female who presents to the emergency department for evaluation of possible seizure-like activity.  Patient has a history of migraines.  Patient states today she was unpacking truck once he had sudden onset of bilateral hand tremors.  Patient's patient states that she has a history of migraines as well.  Patient has outpatient migraine medication.  Patient states that she is dealing with more stress than usual lately.  Patient denies any nausea or vomiting.  Patient denies any chest pain or shortness of breath.  Patient Nuys any fevers or chills.  Patient denies any recent illness    Review of Systems   Constitutional:  Negative for appetite change, chills, fatigue and fever.   HENT:  Negative for congestion, rhinorrhea and sore throat.    Eyes:  Negative for photophobia and visual disturbance.   Respiratory:  Negative for cough, chest tightness and shortness of breath.    Cardiovascular:  Negative for chest pain and palpitations.   Gastrointestinal:  Negative for abdominal pain, diarrhea, nausea and vomiting.   Endocrine: Negative for polydipsia and polyuria.   Genitourinary:  Negative for dysuria.   Musculoskeletal:  Negative for back pain and neck pain.   Skin:  Negative for rash.   Neurological:  Positive for tremors. Negative for dizziness and headaches.         Nursing Notes  TO:  Shahram Bhagat DO  [address]  [place] [address]  [phone]    Call in 2 days        DISCHARGE MEDICATIONS:  Discharge Medication List as of 9/27/2024  7:00 PM               (Please note that portions of this note were completed with a voice recognition program.  Efforts were made to edit the dictations but occasionally words are mis-transcribed.)    Phil Isaac DO (electronically signed)

## 2024-09-30 LAB
EKG ATRIAL RATE: 72 BPM
EKG P AXIS: 48 DEGREES
EKG P-R INTERVAL: 178 MS
EKG Q-T INTERVAL: 424 MS
EKG QRS DURATION: 150 MS
EKG QTC CALCULATION (BAZETT): 464 MS
EKG R AXIS: 41 DEGREES
EKG T AXIS: 31 DEGREES
EKG VENTRICULAR RATE: 72 BPM

## 2024-09-30 PROCEDURE — 93010 ELECTROCARDIOGRAM REPORT: CPT | Performed by: INTERNAL MEDICINE

## 2024-10-16 ENCOUNTER — OFFICE VISIT (OUTPATIENT)
Dept: FAMILY MEDICINE CLINIC | Age: 49
End: 2024-10-16
Payer: COMMERCIAL

## 2024-10-16 VITALS
HEIGHT: 60 IN | HEART RATE: 77 BPM | TEMPERATURE: 97.4 F | SYSTOLIC BLOOD PRESSURE: 113 MMHG | RESPIRATION RATE: 19 BRPM | BODY MASS INDEX: 29.45 KG/M2 | OXYGEN SATURATION: 98 % | DIASTOLIC BLOOD PRESSURE: 70 MMHG | WEIGHT: 150 LBS

## 2024-10-16 DIAGNOSIS — M25.551 RIGHT HIP PAIN: ICD-10-CM

## 2024-10-16 DIAGNOSIS — M54.50 LUMBAR PAIN: Primary | ICD-10-CM

## 2024-10-16 LAB
BILIRUBIN, POC: NORMAL
BLOOD URINE, POC: NORMAL
CLARITY, POC: CLEAR
COLOR, POC: YELLOW
GLUCOSE URINE, POC: NORMAL MG/DL
KETONES, POC: NORMAL MG/DL
LEUKOCYTE EST, POC: NORMAL
NITRITE, POC: NORMAL
PH, POC: 6
PROTEIN, POC: NORMAL MG/DL
SPECIFIC GRAVITY, POC: 1.01
UROBILINOGEN, POC: 0.2 MG/DL

## 2024-10-16 PROCEDURE — 3078F DIAST BP <80 MM HG: CPT

## 2024-10-16 PROCEDURE — 3074F SYST BP LT 130 MM HG: CPT

## 2024-10-16 PROCEDURE — 99213 OFFICE O/P EST LOW 20 MIN: CPT

## 2024-10-16 PROCEDURE — 96372 THER/PROPH/DIAG INJ SC/IM: CPT

## 2024-10-16 PROCEDURE — 81002 URINALYSIS NONAUTO W/O SCOPE: CPT

## 2024-10-16 RX ORDER — DEXAMETHASONE SODIUM PHOSPHATE 10 MG/ML
10 INJECTION INTRAMUSCULAR; INTRAVENOUS ONCE
Status: COMPLETED | OUTPATIENT
Start: 2024-10-16 | End: 2024-10-16

## 2024-10-16 RX ADMIN — DEXAMETHASONE SODIUM PHOSPHATE 10 MG: 10 INJECTION INTRAMUSCULAR; INTRAVENOUS at 13:05

## 2024-10-16 NOTE — PROGRESS NOTES
Chief Complaint   Back Pain (Goes into right hip, shoots down leg)      History of Present Illness   Source of history provided by:  patient.        Valerie Pulido is a 49 y.o. old female presenting to the walk in clinic for evaluation of lumbar back pain and right hip pain for the past 2 weeks. Pt reports known injury as a fall. Pt has had back problems in the past.  Pt states the pain is worse with movement and improves with lying flat. There is some radiation of the pain into the buttocks/leg. Pt denies any bowel/bladder incontinence, abdominal pain, hematuria, N/V/D, fever, chills, HA, neck pain, recent illness, dysuria, or lethargy.    ROS    Unless otherwise stated in this report or unable to obtain because of the patient's clinical or mental status as evidenced by the medical record, this patients's positive and negative responses for Review of Systems, constitutional, psych, eyes, ENT, cardiovascular, respiratory, gastrointestinal, neurological, genitourinary, musculoskeletal, integument systems and systems related to the presenting problem are either stated in the preceding or were not pertinent or were negative for the symptoms and/or complaints related to the medical problem.      Physical Exam         VS:  /70   Pulse 77   Temp 97.4 °F (36.3 °C) (Temporal)   Resp 19   Ht 1.524 m (5')   Wt 68 kg (150 lb)   SpO2 98%   BMI 29.29 kg/m²    Oxygen Saturation Interpretation: Normal.    Constitutional:  Alert, development consistent with age.  Neck:  Normal ROM.  Supple.  No TTP.    Lungs:  Clear to auscultation and breath sounds equal.  Heart:  Regular rate and rhythm, normal heart sounds, without pathological murmurs, ectopy, gallops, or rubs.  Abdomen:  Soft, nontender, good bowel sounds.  No firm or pulsatile mass.  Back: Tenderness: Tenderness to palpation over right lumbar and right hip             Swelling: No edema.               Range of Motion: Decreased lateral and front to back ROM due

## 2024-10-29 ENCOUNTER — TELEPHONE (OUTPATIENT)
Dept: PHYSICAL MEDICINE AND REHAB | Age: 49
End: 2024-10-29

## 2024-10-29 NOTE — TELEPHONE ENCOUNTER
Called and left message on patient voicemail that I was calling to reschedule her appointment for today due to the physician is out sick.

## 2024-10-31 NOTE — PROGRESS NOTES
paresthesias    Recall, prior treatments have included:   Treatment Date Effective   Rest     Ice     Heat     NSAID     Analgesic     Skeletal muscle relaxant X Robaxin    Oral steroids     Antidepressant Cymbalta    Anticonvulsant Lyrica    Topical     Supplements     Physical therapy X    Home exercise program     Manipulation     Massage     Aquatic     Traction     Electric stimulation     Injections     Ergonomics     Alternative therapy     Surgery     Bracing     Activity modifications     Cognitive behavioral therapy       ADL: Self-care  Mobility: independence Device: None  Exercise: never  Work history: unemployed    Past medical, surgical, family, social history, allergies and medications were otherwise reviewed in Epic.      Review of systems was completed by patient and reviewed with me and is scanned in media separately.     Physical Exam:   Blood pressure 120/81, pulse 65, height 1.524 m (5'), weight 68.9 kg (152 lb), not currently breastfeeding.   General: No apparent distress.   Habitus: Body mass index is 29.69 kg/m². Overweight (BMI = 25.0-29.9)   MSK:   Bilateral Hip:  There is no edema, effusion, warmth, mass, deformity or instability of the bilateral hip joint. No tenderness over the bilateral hip joint,  sacroiliac joint,   ASIS,  PSIS,  gluteal muscles,  tensor fascia martina,  iliopsoas or  rectus.  Full painless   AROM. No crepitus. Ryan positive bilaterally, Trendelenburg, Scour negative.  Negative Tinel over the lateral femoral cutaneous nervebilateral. knee ROM are full and painless bilateral.  Lumbar AROM flexion is 30*, extension 0* bilateral lateral flexion 10*.   Negative SLR.       Neurologic: Awake, alert and oriented in three planes.    Sensation: Intact for light touch and pin prick in all upper and lower extremity dermatomes and peripheral nerve distributions.  Intact in the upper and lower extremities for temperature.  Intact at the first Metatarsal-phalangeal joint in

## 2024-11-05 ENCOUNTER — OFFICE VISIT (OUTPATIENT)
Dept: PHYSICAL MEDICINE AND REHAB | Age: 49
End: 2024-11-05
Payer: COMMERCIAL

## 2024-11-05 VITALS
BODY MASS INDEX: 29.84 KG/M2 | DIASTOLIC BLOOD PRESSURE: 81 MMHG | WEIGHT: 152 LBS | HEIGHT: 60 IN | HEART RATE: 65 BPM | SYSTOLIC BLOOD PRESSURE: 120 MMHG

## 2024-11-05 DIAGNOSIS — M70.61 TROCHANTERIC BURSITIS OF RIGHT HIP: ICD-10-CM

## 2024-11-05 DIAGNOSIS — M16.0 PRIMARY OSTEOARTHRITIS OF BOTH HIPS: Primary | ICD-10-CM

## 2024-11-05 PROCEDURE — 3074F SYST BP LT 130 MM HG: CPT | Performed by: PHYSICAL MEDICINE & REHABILITATION

## 2024-11-05 PROCEDURE — 3079F DIAST BP 80-89 MM HG: CPT | Performed by: PHYSICAL MEDICINE & REHABILITATION

## 2024-11-05 PROCEDURE — 99244 OFF/OP CNSLTJ NEW/EST MOD 40: CPT | Performed by: PHYSICAL MEDICINE & REHABILITATION

## 2024-11-14 NOTE — PROGRESS NOTES
[facility]        [address] [address]  Dept: [phone]        Follow up Note      Valerie Pulido     Date of Visit:  11/15/24     CC:  Patient presents for follow up   Chief Complaint   Patient presents with    Follow-up     New pain in both hips, and her fibromyalgia meds are not being filled by her other doctor anymore       HPI:    Pain is unchanged.  Change in quality of symptoms:no.    Medication side effects:none.   Recent diagnostic testing:none.  Recent interventional procedures:none.    She has not been on anticoagulation medications to include ASA, NSAIDS, Plavix, heparin, LMW heparin, and warfarin and has not been on herbal supplements.  She is not diabetic.     Imagin/2024 EDX -   Diagnostic Interpretation:   This study was Abnormal     Electrodiagnosis: Extensive electrodiagnostic examination of bilateral upper extremities is somewhat limited by incomplete motor unit activation in several muscles examined by needle. This may be secondary to pain, incomplete voluntary effort or a central disorder of motor unit control. Nevertheless, the study reveals evidence of bilateral median neuropathy at or distal to the wrists, consistent with a clinical diagnosis of bilateral carpal tunnel syndrome, severe in degree electrically in the left and mild in degree in the right. There is no evidence of a generalized sensorimotor peripheral neuropathy of the large fiber type or a left cervical motor radiculopathy.             Previous Study: emg/NCS of right arm done in 2016 reveals evidence of borderline carpal tunnel syndrome in the right.    The present study shows progression of severity of right median neuropathy.      Follow up EMG is recommended if clinically indicated or if there is clinical progression .            Physician:Lucinda Sweeney MD    2024 MRI brain w/o -  FINDINGS:  INTRACRANIAL STRUCTURES/VENTRICLES: There is no acute infarct.

## 2024-11-15 ENCOUNTER — OFFICE VISIT (OUTPATIENT)
Dept: PAIN MANAGEMENT | Age: 49
End: 2024-11-15
Payer: COMMERCIAL

## 2024-11-15 VITALS
TEMPERATURE: 97.3 F | DIASTOLIC BLOOD PRESSURE: 75 MMHG | SYSTOLIC BLOOD PRESSURE: 111 MMHG | BODY MASS INDEX: 29.84 KG/M2 | RESPIRATION RATE: 16 BRPM | HEIGHT: 60 IN | HEART RATE: 69 BPM | WEIGHT: 152 LBS | OXYGEN SATURATION: 95 %

## 2024-11-15 DIAGNOSIS — M79.10 MYALGIA: ICD-10-CM

## 2024-11-15 DIAGNOSIS — M79.7 FIBROMYALGIA: ICD-10-CM

## 2024-11-15 DIAGNOSIS — M54.2 CERVICALGIA: ICD-10-CM

## 2024-11-15 DIAGNOSIS — G89.4 CHRONIC PAIN SYNDROME: Primary | ICD-10-CM

## 2024-11-15 PROCEDURE — 99213 OFFICE O/P EST LOW 20 MIN: CPT | Performed by: PAIN MEDICINE

## 2024-11-15 RX ORDER — PREGABALIN 150 MG/1
150 CAPSULE ORAL 3 TIMES DAILY
Qty: 270 CAPSULE | Refills: 0 | Status: SHIPPED | OUTPATIENT
Start: 2024-11-15 | End: 2025-02-13

## 2024-11-15 RX ORDER — LIDOCAINE 50 MG/G
3 PATCH TOPICAL DAILY
Qty: 90 PATCH | Refills: 2 | Status: SHIPPED | OUTPATIENT
Start: 2024-11-15 | End: 2025-02-13

## 2024-11-15 NOTE — PROGRESS NOTES
Valerie Pulido presents to the [facility] on 11/15/2024. Valerie is complaining of pain in her hips, as well as needing meds for her fibromyalgia. The pain is constant. The pain is described as throbbing, dull, and piercing. Pain is rated on her best day at a 7, on her worst day at a 10, and on average at a 8 on the VAS scale. She took her last dose of Lyrica, Cymbalta, and robaxin this morning  .     Any procedures since your last visit: No    Pacemaker or defibrillator: No managed by na.    She is not on NSAIDS and is  on anticoagulation medications to include ASA and is managed by Dr. Khan?.     Do you want someone present when the provider examines you? No    Medication Contract and Consent for Opioid Use Documents Filed       Patient Documents       Type of Document Status Date Received Received By Description    Medication Contract [Status Missing]  MICHELE DAVIS 4/21/14 Neurosurgery Medication Contract                    There were no vitals taken for this visit.     No LMP recorded. Patient has had a hysterectomy.

## 2024-11-19 ENCOUNTER — OFFICE VISIT (OUTPATIENT)
Dept: FAMILY MEDICINE CLINIC | Age: 49
End: 2024-11-19
Payer: COMMERCIAL

## 2024-11-19 VITALS
OXYGEN SATURATION: 94 % | BODY MASS INDEX: 27.61 KG/M2 | RESPIRATION RATE: 20 BRPM | SYSTOLIC BLOOD PRESSURE: 122 MMHG | WEIGHT: 140.6 LBS | TEMPERATURE: 98.3 F | HEART RATE: 103 BPM | HEIGHT: 60 IN | DIASTOLIC BLOOD PRESSURE: 83 MMHG

## 2024-11-19 DIAGNOSIS — I10 ESSENTIAL HYPERTENSION: ICD-10-CM

## 2024-11-19 DIAGNOSIS — R11.0 NAUSEA: ICD-10-CM

## 2024-11-19 DIAGNOSIS — Z12.31 BREAST CANCER SCREENING BY MAMMOGRAM: ICD-10-CM

## 2024-11-19 DIAGNOSIS — F17.210 CIGARETTE NICOTINE DEPENDENCE WITHOUT COMPLICATION: Chronic | ICD-10-CM

## 2024-11-19 DIAGNOSIS — G93.5 CHIARI I MALFORMATION (HCC): ICD-10-CM

## 2024-11-19 DIAGNOSIS — G40.909 SEIZURE DISORDER (HCC): Chronic | ICD-10-CM

## 2024-11-19 DIAGNOSIS — R10.13 EPIGASTRIC PAIN: Primary | ICD-10-CM

## 2024-11-19 DIAGNOSIS — E55.9 VITAMIN D DEFICIENCY: ICD-10-CM

## 2024-11-19 DIAGNOSIS — R07.89 CHEST WALL PAIN: ICD-10-CM

## 2024-11-19 DIAGNOSIS — Z23 NEED FOR PNEUMOCOCCAL VACCINATION: ICD-10-CM

## 2024-11-19 LAB
BASOPHILS ABSOLUTE: 0.09 K/UL (ref 0–0.2)
BASOPHILS RELATIVE PERCENT: 1 % (ref 0–2)
EOSINOPHILS ABSOLUTE: 0.07 K/UL (ref 0.05–0.5)
EOSINOPHILS RELATIVE PERCENT: 1 % (ref 0–6)
HCT VFR BLD CALC: 29.6 % (ref 34–48)
HEMOGLOBIN: 7.9 G/DL (ref 11.5–15.5)
IMMATURE GRANULOCYTES %: 1 % (ref 0–5)
IMMATURE GRANULOCYTES ABSOLUTE: 0.08 K/UL (ref 0–0.58)
LYMPHOCYTES ABSOLUTE: 2.12 K/UL (ref 1.5–4)
LYMPHOCYTES RELATIVE PERCENT: 17 % (ref 20–42)
MCH RBC QN AUTO: 19.9 PG (ref 26–35)
MCHC RBC AUTO-ENTMCNC: 26.7 G/DL (ref 32–34.5)
MCV RBC AUTO: 74.6 FL (ref 80–99.9)
MONOCYTES ABSOLUTE: 0.75 K/UL (ref 0.1–0.95)
MONOCYTES RELATIVE PERCENT: 6 % (ref 2–12)
NEUTROPHILS ABSOLUTE: 9.73 K/UL (ref 1.8–7.3)
NEUTROPHILS RELATIVE PERCENT: 76 % (ref 43–80)
PDW BLD-RTO: 22.3 % (ref 11.5–15)
PLATELET # BLD: 301 K/UL (ref 130–450)
PMV BLD AUTO: 10.7 FL (ref 7–12)
RBC # BLD: 3.97 M/UL (ref 3.5–5.5)
RBC # BLD: ABNORMAL 10*6/UL
WBC # BLD: 12.8 K/UL (ref 4.5–11.5)

## 2024-11-19 PROCEDURE — 3078F DIAST BP <80 MM HG: CPT | Performed by: FAMILY MEDICINE

## 2024-11-19 PROCEDURE — 3074F SYST BP LT 130 MM HG: CPT | Performed by: FAMILY MEDICINE

## 2024-11-19 PROCEDURE — 99213 OFFICE O/P EST LOW 20 MIN: CPT | Performed by: FAMILY MEDICINE

## 2024-11-19 RX ORDER — ONDANSETRON 4 MG/1
4 TABLET, ORALLY DISINTEGRATING ORAL 3 TIMES DAILY PRN
Qty: 21 TABLET | Refills: 0 | Status: SHIPPED | OUTPATIENT
Start: 2024-11-19

## 2024-11-19 RX ORDER — PANTOPRAZOLE SODIUM 40 MG/1
40 TABLET, DELAYED RELEASE ORAL
Qty: 30 TABLET | Refills: 0 | Status: SHIPPED | OUTPATIENT
Start: 2024-11-19

## 2024-11-19 ASSESSMENT — ENCOUNTER SYMPTOMS
BACK PAIN: 1
DIARRHEA: 0
SHORTNESS OF BREATH: 0
NAUSEA: 1
CONSTIPATION: 0
BLOOD IN STOOL: 0
ABDOMINAL PAIN: 1
VOMITING: 0

## 2024-11-19 NOTE — PROGRESS NOTES
Valerie Pulido   Patient is a 49 y.o. year old female who presents with:  Chief Complaint   Patient presents with    Abdominal Pain     Pain from center of her back around her abdomen severl increased recently    Dizziness     When she stands and walks, sometimes while laying down    GI Problem     Heartburn, nausea, vomiting in last few weeks; appetite poor; drinking some fluids     Patient also follows with: neurology, PM    Hx gastric bypass 2004    HPI    Patient was last seen 7/2024, plan at that time was to begin Cymbalta and follow-up in 6 weeks.    Abdominal pain  Onset arount 2-3 weeks ago, has had similar pain in the past   Worsening over time  Persistent and waxes and wanes  States pain starts at the bilateral back and radiates to the epigastrium  Character: sharp  Admits to nausea, heartburn, dry heaving, indigestion  Seeing PM for back pain  Reports hx of GERD, improved after gastric bypass.   Tried tums and edibles, tums have been inconsistently effective.   Last EGD in the remote past.  No dysphagia, blood in the stool or black stools.   Keeping water down     Neuroforaminal stenosis  Seeing pain medicine    Review of Systems   Constitutional:  Negative for chills.   Respiratory:  Negative for shortness of breath.    Cardiovascular:  Negative for chest pain, palpitations and leg swelling.   Gastrointestinal:  Positive for abdominal pain and nausea. Negative for blood in stool, constipation, diarrhea and vomiting.   Musculoskeletal:  Positive for back pain.       Current Outpatient Medications   Medication Sig Dispense Refill    pantoprazole (PROTONIX) 40 MG tablet Take 1 tablet by mouth every morning (before breakfast) 30 tablet 0    ondansetron (ZOFRAN-ODT) 4 MG disintegrating tablet Take 1 tablet by mouth 3 times daily as needed for Nausea or Vomiting 21 tablet 0    pregabalin (LYRICA) 150 MG capsule Take 1 capsule by mouth in the morning, at noon, and at bedtime for 90 days. Max Daily Amount: 450 mg

## 2024-11-20 LAB
ALBUMIN: 3.7 G/DL (ref 3.5–5.2)
ALP BLD-CCNC: 105 U/L (ref 35–104)
ALT SERPL-CCNC: 9 U/L (ref 0–32)
ANION GAP SERPL CALCULATED.3IONS-SCNC: 18 MMOL/L (ref 7–16)
AST SERPL-CCNC: 28 U/L (ref 0–31)
BILIRUB SERPL-MCNC: 0.3 MG/DL (ref 0–1.2)
BUN BLDV-MCNC: 11 MG/DL (ref 6–20)
CALCIUM SERPL-MCNC: 10.4 MG/DL (ref 8.6–10.2)
CHLORIDE BLD-SCNC: 101 MMOL/L (ref 98–107)
CHOLESTEROL, TOTAL: 99 MG/DL
CO2: 20 MMOL/L (ref 22–29)
CREAT SERPL-MCNC: 0.6 MG/DL (ref 0.5–1)
GFR, ESTIMATED: >90 ML/MIN/1.73M2
GLUCOSE BLD-MCNC: 97 MG/DL (ref 74–99)
HDLC SERPL-MCNC: 33 MG/DL
LDL CHOLESTEROL: 44 MG/DL
POTASSIUM SERPL-SCNC: 4.7 MMOL/L (ref 3.5–5)
SODIUM BLD-SCNC: 139 MMOL/L (ref 132–146)
T4 FREE: 1.3 NG/DL (ref 0.9–1.7)
TOTAL PROTEIN: 6.6 G/DL (ref 6.4–8.3)
TRIGL SERPL-MCNC: 108 MG/DL
TSH SERPL DL<=0.05 MIU/L-ACNC: 0.44 UIU/ML (ref 0.27–4.2)
VITAMIN D 25-HYDROXY: 55.3 NG/ML (ref 30–100)
VLDLC SERPL CALC-MCNC: 22 MG/DL

## 2024-12-04 ENCOUNTER — OFFICE VISIT (OUTPATIENT)
Dept: ORTHOPEDIC SURGERY | Age: 49
End: 2024-12-04
Payer: COMMERCIAL

## 2024-12-04 VITALS — WEIGHT: 140 LBS | HEIGHT: 60 IN | BODY MASS INDEX: 27.48 KG/M2

## 2024-12-04 DIAGNOSIS — G56.03 BILATERAL CARPAL TUNNEL SYNDROME: Primary | ICD-10-CM

## 2024-12-04 PROCEDURE — 99203 OFFICE O/P NEW LOW 30 MIN: CPT | Performed by: ORTHOPAEDIC SURGERY

## 2024-12-04 ASSESSMENT — ENCOUNTER SYMPTOMS
ALLERGIC/IMMUNOLOGIC NEGATIVE: 1
EYE DISCHARGE: 0
SHORTNESS OF BREATH: 0
ABDOMINAL DISTENTION: 0

## 2024-12-04 NOTE — PROGRESS NOTES
Valerie Pulido (:  1975) is a 49 y.o. female,New patient, here for evaluation of the following chief complaint(s):  Hand Pain (Patient is here today for b/l hand pain. Patient is RHD. Pain is rated as a 10/10 on both hands. Patient has numbness and tingling in both hands and describes her pain as burning. )      Assessment & Plan   ASSESSMENT/PLAN:  1. Bilateral carpal tunnel syndrome    Discussed EMG results and treatment options for carpal tunnel including brace, NSAIDs, injections, or surgery.  Patient desires surgical intervention.    Will refer to Dr. Fink     Return if symptoms worsen or fail to improve.         Subjective   SUBJECTIVE/OBJECTIVE:  Hand Pain   Pertinent negatives include no chest pain.       49-year-old female here today for bilateral hand numbness.  Right is worse than left.  She rates her pain 10 out of 10.  She states that she has numbness in mainly her first 3 digits but occasionally her fourth.  She has a recent EMG which demonstrates of bilateral carpal tunnel.  She is here today to discuss further treatment    Past Medical History:   Diagnosis Date    Acute cerebrovascular accident (CVA) ([facility]) 2024    Chest pain 2012    Chronic back pain     Chronic neck pain     Depression     DJD (degenerative joint disease) of lumbar spine     Dyslipidemia 2023    Essential hypertension 2023    Grand mal seizure ([facility])     9 years ago    Headache     History of cardiovascular stress test 2012    LEXISCAN    Hypoglycemic syndrome     Nausea & vomiting     Ovarian cyst     Seizure disorder ([facility]) 2012    Spina bifida ([facility])     Stroke-like episode 2024     Past Surgical History:   Procedure Laterality Date    BACK SURGERY  2014     SECTION      CHOLECYSTECTOMY, LAPAROSCOPIC  3/11/16    with IOC    COLONOSCOPY      ECHO COMPL W DOP COLOR FLOW  2012         ENDOSCOPY, COLON, DIAGNOSTIC      GASTRIC BYPASS SURGERY      HYSTERECTOMY

## 2024-12-11 ENCOUNTER — OFFICE VISIT (OUTPATIENT)
Dept: ORTHOPEDIC SURGERY | Age: 49
End: 2024-12-11
Payer: COMMERCIAL

## 2024-12-11 ENCOUNTER — PREP FOR PROCEDURE (OUTPATIENT)
Dept: ORTHOPEDIC SURGERY | Age: 49
End: 2024-12-11

## 2024-12-11 ENCOUNTER — TELEPHONE (OUTPATIENT)
Dept: ORTHOPEDIC SURGERY | Age: 49
End: 2024-12-11

## 2024-12-11 VITALS — WEIGHT: 140 LBS | HEIGHT: 60 IN | BODY MASS INDEX: 27.48 KG/M2

## 2024-12-11 DIAGNOSIS — G56.01 CARPAL TUNNEL SYNDROME ON RIGHT: ICD-10-CM

## 2024-12-11 DIAGNOSIS — G56.03 BILATERAL CARPAL TUNNEL SYNDROME: Primary | ICD-10-CM

## 2024-12-11 PROCEDURE — 99204 OFFICE O/P NEW MOD 45 MIN: CPT | Performed by: ORTHOPAEDIC SURGERY

## 2024-12-11 NOTE — TELEPHONE ENCOUNTER
Prior Authorization Form:      DEMOGRAPHICS:                     Patient Name:  Gaby Pulido  Patient :  1975            Insurance:  Payor: KG / Plan: KG TELLO LUCILA / Product Type: *No Product type* /   Insurance ID Number:    Payer/Plan Subscr  Sex Relation Sub. Ins. ID Effective Group Num   [facility] 1975 [facility] [id number]                                    [address]   2. [facility] CGABY MULLINS 1975 Female Self [id number]                                    [address]         DIAGNOSIS & PROCEDURE:                       Procedure/Operation: transverse release carpal tunnel ligament - right            CPT Code: 15801    Diagnosis:  Carpal tunnel syndrome on right    ICD10 Code: G56.01    Location:  [facility]    Surgeon:  Dr. DEAN Fink    SCHEDULING INFORMATION:                          Date: 2025    Time: TBD              Anesthesia:  Liu Block                                                       Status:  Outpatient        Special Comments:         Electronically signed by Kelli Sharp MA on 2024 at 10:24 AM

## 2024-12-11 NOTE — PROGRESS NOTES
Family History   Problem Relation Age of Onset    Diabetes Mother     Heart Attack Mother 55    Heart Attack Father         young    Cancer Maternal Aunt         breast, uterine, cervical    Heart Attack Maternal Uncle         3     Heart Disease Other          Review of Systems:   As follows except as previously noted in HPI:  Constitutional: Negative for chills, diaphoresis,  fever   Respiratory: Negative for cough, shortness of breath and wheezing.    Cardiovascular: Negative for chest pain and palpitations.   Neurological: Negative for dizziness, syncope,   GI / : abdominal pain or cramping  Musculoskeletal: see HPI       Objective:   Physical Exam   Constitutional: Oriented to person, place, and time. and appears well-developed and well-nourished. :   Head: Normocephalic and atraumatic.   Neck: Neck supple.  Eyes: EOM are normal.   Pulmonary/Chest: Effort normal.  No respiratory distress, no wheezes.   Neurological: Alert and oriented to person  Skin: Skin is warm and dry.               DEAN Fink, DO    24  10:13 AM    All reasonable efforts have been made to minimize the risk of errors that may occur in the use of voice recognition and other electronic means of charting.

## 2024-12-15 DIAGNOSIS — R10.13 EPIGASTRIC PAIN: ICD-10-CM

## 2024-12-16 RX ORDER — PANTOPRAZOLE SODIUM 40 MG/1
TABLET, DELAYED RELEASE ORAL
Qty: 30 TABLET | Refills: 1 | Status: SHIPPED | OUTPATIENT
Start: 2024-12-16

## 2024-12-16 NOTE — TELEPHONE ENCOUNTER
Name of Medication(s) Requested:  Requested Prescriptions     Pending Prescriptions Disp Refills    pantoprazole (PROTONIX) 40 MG tablet [Pharmacy Med Name: Pantoprazole Sodium 40 MG Oral Tablet Delayed Release] 30 tablet 0     Sig: TAKE 1 TABLET BY MOUTH ONCE DAILY IN THE MORNING BEFORE BREAKFAST       Medication is on current medication list Yes    Dosage and directions were verified? Yes    Quantity verified: 30 day supply     Pharmacy Verified?  Yes    Last Appointment:  11/19/2024    Future appts:  Future Appointments   Date Time Provider Department Center   2/5/2025 10:30 AM Shahram Bhagat DO [facility]   2/11/2025 11:30 AM Luz Olivares PA [facility]        (If no appt send self scheduling link. .REFILLAPPT)  Scheduling request sent?     [] Yes  [x] No    Does patient need updated?  [] Yes  [x] No

## 2024-12-17 ENCOUNTER — OFFICE VISIT (OUTPATIENT)
Dept: FAMILY MEDICINE CLINIC | Age: 49
End: 2024-12-17
Payer: COMMERCIAL

## 2024-12-17 VITALS
HEIGHT: 60 IN | TEMPERATURE: 98.2 F | OXYGEN SATURATION: 99 % | BODY MASS INDEX: 28.07 KG/M2 | HEART RATE: 75 BPM | DIASTOLIC BLOOD PRESSURE: 81 MMHG | RESPIRATION RATE: 16 BRPM | SYSTOLIC BLOOD PRESSURE: 125 MMHG | WEIGHT: 143 LBS

## 2024-12-17 DIAGNOSIS — R60.0 BILATERAL LOWER EXTREMITY EDEMA: Primary | ICD-10-CM

## 2024-12-17 PROCEDURE — 99213 OFFICE O/P EST LOW 20 MIN: CPT | Performed by: FAMILY MEDICINE

## 2024-12-17 PROCEDURE — 3079F DIAST BP 80-89 MM HG: CPT | Performed by: FAMILY MEDICINE

## 2024-12-17 PROCEDURE — 3074F SYST BP LT 130 MM HG: CPT | Performed by: FAMILY MEDICINE

## 2024-12-17 RX ORDER — FUROSEMIDE 20 MG/1
20 TABLET ORAL DAILY PRN
Qty: 30 TABLET | Refills: 0 | Status: SHIPPED | OUTPATIENT
Start: 2024-12-17

## 2024-12-17 SDOH — ECONOMIC STABILITY: FOOD INSECURITY: WITHIN THE PAST 12 MONTHS, YOU WORRIED THAT YOUR FOOD WOULD RUN OUT BEFORE YOU GOT MONEY TO BUY MORE.: NEVER TRUE

## 2024-12-17 SDOH — ECONOMIC STABILITY: INCOME INSECURITY: HOW HARD IS IT FOR YOU TO PAY FOR THE VERY BASICS LIKE FOOD, HOUSING, MEDICAL CARE, AND HEATING?: NOT HARD AT ALL

## 2024-12-17 SDOH — ECONOMIC STABILITY: FOOD INSECURITY: WITHIN THE PAST 12 MONTHS, THE FOOD YOU BOUGHT JUST DIDN'T LAST AND YOU DIDN'T HAVE MONEY TO GET MORE.: NEVER TRUE

## 2024-12-17 ASSESSMENT — ENCOUNTER SYMPTOMS: SHORTNESS OF BREATH: 0

## 2024-12-17 NOTE — PROGRESS NOTES
completely black\"    Ancef [Cefazolin Sodium] Rash    Lithium Diarrhea    Morphine Nausea And Vomiting       Family History   Problem Relation Age of Onset    Diabetes Mother     Heart Attack Mother 55    Heart Attack Father         young    Cancer Maternal Aunt         breast, uterine, cervical    Heart Attack Maternal Uncle         3     Heart Disease Other        Social History     Socioeconomic History    Marital status:      Spouse name: None    Number of children: None    Years of education: None    Highest education level: None   Tobacco Use    Smoking status: Every Day     Current packs/day: 1.00     Average packs/day: 1 pack/day for 12.0 years (12.0 ttl pk-yrs)     Types: Cigarettes     Start date: 2013    Smokeless tobacco: Never    Tobacco comments:     States smokes 1-2 packs daily cigarettes   Vaping Use    Vaping status: Never Used   Substance and Sexual Activity    Alcohol use: No     Comment: previous alcoholic    Drug use: Yes     Types: Marijuana (Weed)     Comment: edibles for pain    Sexual activity: Yes     Partners: Male     Comment: monogamous with      Social Determinants of Health     Financial Resource Strain: Low Risk  (2024)    Overall Financial Resource Strain (CARDIA)     Difficulty of Paying Living Expenses: Not hard at all   Food Insecurity: No Food Insecurity (2024)    Hunger Vital Sign     Worried About Running Out of Food in the Last Year: Never true     Ran Out of Food in the Last Year: Never true   Transportation Needs: Unknown (2024)    PRAPARE - Transportation     Lack of Transportation (Non-Medical): No   Housing Stability: Unknown (2024)    Housing Stability Vital Sign     Homeless in the Last Year: No       OBJECTIVE    /81   Pulse 75   Temp 98.2 °F (36.8 °C) (Temporal)   Resp 16   Ht 1.524 m (5')   Wt 64.9 kg (143 lb)   SpO2 99%   BMI 27.93 kg/m²     Wt Readings from Last 3 Encounters:   24 64.9 kg (143 lb)

## 2024-12-20 ENCOUNTER — TELEPHONE (OUTPATIENT)
Dept: FAMILY MEDICINE CLINIC | Age: 49
End: 2024-12-20

## 2024-12-20 NOTE — TELEPHONE ENCOUNTER
Notified Dr Fink office pt offered apt for pre op prior to surg and she stated she had no transportation and refused appt

## 2024-12-24 DIAGNOSIS — I10 ESSENTIAL HYPERTENSION: Chronic | ICD-10-CM

## 2024-12-24 RX ORDER — AMLODIPINE BESYLATE 10 MG/1
10 TABLET ORAL DAILY
Qty: 90 TABLET | Refills: 0 | OUTPATIENT
Start: 2024-12-24

## 2024-12-30 RX ORDER — ATORVASTATIN CALCIUM 20 MG/1
20 TABLET, FILM COATED ORAL NIGHTLY
Qty: 90 TABLET | Refills: 0 | Status: SHIPPED | OUTPATIENT
Start: 2024-12-30

## 2024-12-30 NOTE — TELEPHONE ENCOUNTER
Pt called asking Dr Bhagat to refill this medication    Name of Medication(s) Requested:  Requested Prescriptions     Pending Prescriptions Disp Refills    atorvastatin (LIPITOR) 20 MG tablet 90 tablet 0     Sig: Take 1 tablet by mouth nightly         Medication is on current medication list Yes    Dosage and directions were verified? Yes    Quantity verified: 90 day supply     Pharmacy Verified?  Yes    Last Appointment:  12/17/2024    Future appts:  Future Appointments   Date Time Provider Department Center   1/8/2025  8:15 AM Shahram Bhagat DO [facility]   2/5/2025 10:30 AM Shahram Bhagat DO [facility]   2/11/2025 11:30 AM Luz Olivares PA [facility]   5/8/2025 10:20 AM Lucinda Sweeney MD [facility] NEURO Neurology -        (If no appt send self scheduling link. .REFILLAPPT)  Scheduling request sent?     [] Yes  [x] No    Does patient need updated?  [] Yes  [x] No    [facility]

## 2025-01-08 ENCOUNTER — OFFICE VISIT (OUTPATIENT)
Dept: FAMILY MEDICINE CLINIC | Age: 50
End: 2025-01-08
Payer: COMMERCIAL

## 2025-01-08 ENCOUNTER — HOSPITAL ENCOUNTER (EMERGENCY)
Age: 50
Discharge: HOME | End: 2025-01-09
Attending: STUDENT IN AN ORGANIZED HEALTH CARE EDUCATION/TRAINING PROGRAM
Payer: COMMERCIAL

## 2025-01-08 VITALS
HEART RATE: 78 BPM | WEIGHT: 143 LBS | RESPIRATION RATE: 16 BRPM | BODY MASS INDEX: 28.07 KG/M2 | SYSTOLIC BLOOD PRESSURE: 108 MMHG | DIASTOLIC BLOOD PRESSURE: 68 MMHG | HEIGHT: 60 IN | OXYGEN SATURATION: 98 % | TEMPERATURE: 97.3 F

## 2025-01-08 DIAGNOSIS — I63.10 CEREBROVASCULAR ACCIDENT (CVA) DUE TO EMBOLISM OF PRECEREBRAL ARTERY (HCC): ICD-10-CM

## 2025-01-08 DIAGNOSIS — D64.9 ANEMIA, UNSPECIFIED TYPE: Primary | ICD-10-CM

## 2025-01-08 DIAGNOSIS — Z01.818 ENCOUNTER FOR PREOPERATIVE ASSESSMENT: Primary | ICD-10-CM

## 2025-01-08 DIAGNOSIS — F32.A ANXIETY AND DEPRESSION: ICD-10-CM

## 2025-01-08 DIAGNOSIS — Z01.818 ENCOUNTER FOR PREOPERATIVE ASSESSMENT: ICD-10-CM

## 2025-01-08 DIAGNOSIS — I10 ESSENTIAL HYPERTENSION: ICD-10-CM

## 2025-01-08 DIAGNOSIS — F41.9 ANXIETY AND DEPRESSION: ICD-10-CM

## 2025-01-08 DIAGNOSIS — D50.9 MICROCYTIC ANEMIA: ICD-10-CM

## 2025-01-08 DIAGNOSIS — I63.9 CEREBROVASCULAR ACCIDENT (CVA), UNSPECIFIED MECHANISM (HCC): ICD-10-CM

## 2025-01-08 DIAGNOSIS — G56.01 RIGHT CARPAL TUNNEL SYNDROME: ICD-10-CM

## 2025-01-08 LAB
ANION GAP SERPL CALCULATED.3IONS-SCNC: 8 MMOL/L (ref 7–16)
BASOPHILS # BLD: 0.08 K/UL (ref 0–0.2)
BASOPHILS NFR BLD: 1 % (ref 0–2)
BUN SERPL-MCNC: 11 MG/DL (ref 6–20)
CALCIUM SERPL-MCNC: 10.1 MG/DL (ref 8.6–10.2)
CHLORIDE SERPL-SCNC: 113 MMOL/L (ref 98–107)
CHOLESTEROL, FASTING: 120 MG/DL
CO2 SERPL-SCNC: 26 MMOL/L (ref 22–29)
CREAT SERPL-MCNC: 0.7 MG/DL (ref 0.5–1)
EOSINOPHIL # BLD: 0.16 K/UL (ref 0.05–0.5)
EOSINOPHILS RELATIVE PERCENT: 2 % (ref 0–6)
ERYTHROCYTE [DISTWIDTH] IN BLOOD BY AUTOMATED COUNT: 24.3 % (ref 11.5–15)
GFR, ESTIMATED: >90 ML/MIN/1.73M2
GLUCOSE SERPL-MCNC: 93 MG/DL (ref 74–99)
HCT VFR BLD AUTO: 25.7 % (ref 34–48)
HDLC SERPL-MCNC: 38 MG/DL
HGB BLD-MCNC: 6.9 G/DL (ref 11.5–15.5)
LDL CHOLESTEROL: 49 MG/DL
LYMPHOCYTES NFR BLD: 1.69 K/UL (ref 1.5–4)
LYMPHOCYTES RELATIVE PERCENT: 19 % (ref 20–42)
MCH RBC QN AUTO: 19.3 PG (ref 26–35)
MCHC RBC AUTO-ENTMCNC: 26.8 G/DL (ref 32–34.5)
MCV RBC AUTO: 72 FL (ref 80–99.9)
MONOCYTES NFR BLD: 0.4 K/UL (ref 0.1–0.95)
MONOCYTES NFR BLD: 5 % (ref 2–12)
NEUTROPHILS NFR BLD: 74 % (ref 43–80)
NEUTS SEG NFR BLD: 6.67 K/UL (ref 1.8–7.3)
PLATELET, FLUORESCENCE: 305 K/UL (ref 130–450)
PMV BLD AUTO: 10.2 FL (ref 7–12)
POTASSIUM SERPL-SCNC: 4.2 MMOL/L (ref 3.5–5)
RBC # BLD AUTO: 3.57 M/UL (ref 3.5–5.5)
RBC # BLD: ABNORMAL 10*6/UL
SODIUM SERPL-SCNC: 147 MMOL/L (ref 132–146)
TRIGLYCERIDE, FASTING: 164 MG/DL
TROPONIN I SERPL HS-MCNC: <6 NG/L (ref 0–9)
VLDLC SERPL CALC-MCNC: 33 MG/DL
WBC OTHER # BLD: 9 K/UL (ref 4.5–11.5)

## 2025-01-08 PROCEDURE — 99214 OFFICE O/P EST MOD 30 MIN: CPT | Performed by: FAMILY MEDICINE

## 2025-01-08 PROCEDURE — 96374 THER/PROPH/DIAG INJ IV PUSH: CPT

## 2025-01-08 PROCEDURE — 93000 ELECTROCARDIOGRAM COMPLETE: CPT | Performed by: FAMILY MEDICINE

## 2025-01-08 PROCEDURE — 6360000002 HC RX W HCPCS: Performed by: STUDENT IN AN ORGANIZED HEALTH CARE EDUCATION/TRAINING PROGRAM

## 2025-01-08 PROCEDURE — 86901 BLOOD TYPING SEROLOGIC RH(D): CPT

## 2025-01-08 PROCEDURE — 80048 BASIC METABOLIC PNL TOTAL CA: CPT

## 2025-01-08 PROCEDURE — 86923 COMPATIBILITY TEST ELECTRIC: CPT

## 2025-01-08 PROCEDURE — 86850 RBC ANTIBODY SCREEN: CPT

## 2025-01-08 PROCEDURE — 3078F DIAST BP <80 MM HG: CPT | Performed by: FAMILY MEDICINE

## 2025-01-08 PROCEDURE — 96361 HYDRATE IV INFUSION ADD-ON: CPT

## 2025-01-08 PROCEDURE — 99285 EMERGENCY DEPT VISIT HI MDM: CPT

## 2025-01-08 PROCEDURE — 93005 ELECTROCARDIOGRAM TRACING: CPT | Performed by: STUDENT IN AN ORGANIZED HEALTH CARE EDUCATION/TRAINING PROGRAM

## 2025-01-08 PROCEDURE — 3074F SYST BP LT 130 MM HG: CPT | Performed by: FAMILY MEDICINE

## 2025-01-08 PROCEDURE — 85025 COMPLETE CBC W/AUTO DIFF WBC: CPT

## 2025-01-08 PROCEDURE — 84484 ASSAY OF TROPONIN QUANT: CPT

## 2025-01-08 PROCEDURE — 86900 BLOOD TYPING SEROLOGIC ABO: CPT

## 2025-01-08 PROCEDURE — 2580000003 HC RX 258: Performed by: STUDENT IN AN ORGANIZED HEALTH CARE EDUCATION/TRAINING PROGRAM

## 2025-01-08 PROCEDURE — P9016 RBC LEUKOCYTES REDUCED: HCPCS

## 2025-01-08 RX ORDER — SODIUM CHLORIDE 9 MG/ML
INJECTION, SOLUTION INTRAVENOUS PRN
Status: COMPLETED | OUTPATIENT
Start: 2025-01-08 | End: 2025-01-09

## 2025-01-08 RX ADMIN — SODIUM CHLORIDE: 9 INJECTION, SOLUTION INTRAVENOUS at 23:16

## 2025-01-08 RX ADMIN — SODIUM CHLORIDE 80 MG: 9 INJECTION INTRAMUSCULAR; INTRAVENOUS; SUBCUTANEOUS at 20:51

## 2025-01-08 ASSESSMENT — PATIENT HEALTH QUESTIONNAIRE - PHQ9
6. FEELING BAD ABOUT YOURSELF - OR THAT YOU ARE A FAILURE OR HAVE LET YOURSELF OR YOUR FAMILY DOWN: NOT AT ALL
1. LITTLE INTEREST OR PLEASURE IN DOING THINGS: NOT AT ALL
3. TROUBLE FALLING OR STAYING ASLEEP: NOT AT ALL
SUM OF ALL RESPONSES TO PHQ QUESTIONS 1-9: 0
SUM OF ALL RESPONSES TO PHQ QUESTIONS 1-9: 0
5. POOR APPETITE OR OVEREATING: NOT AT ALL
SUM OF ALL RESPONSES TO PHQ QUESTIONS 1-9: 0
SUM OF ALL RESPONSES TO PHQ QUESTIONS 1-9: 0
10. IF YOU CHECKED OFF ANY PROBLEMS, HOW DIFFICULT HAVE THESE PROBLEMS MADE IT FOR YOU TO DO YOUR WORK, TAKE CARE OF THINGS AT HOME, OR GET ALONG WITH OTHER PEOPLE: NOT DIFFICULT AT ALL
4. FEELING TIRED OR HAVING LITTLE ENERGY: NOT AT ALL
7. TROUBLE CONCENTRATING ON THINGS, SUCH AS READING THE NEWSPAPER OR WATCHING TELEVISION: NOT AT ALL
8. MOVING OR SPEAKING SO SLOWLY THAT OTHER PEOPLE COULD HAVE NOTICED. OR THE OPPOSITE, BEING SO FIGETY OR RESTLESS THAT YOU HAVE BEEN MOVING AROUND A LOT MORE THAN USUAL: NOT AT ALL
2. FEELING DOWN, DEPRESSED OR HOPELESS: NOT AT ALL
9. THOUGHTS THAT YOU WOULD BE BETTER OFF DEAD, OR OF HURTING YOURSELF: NOT AT ALL
SUM OF ALL RESPONSES TO PHQ9 QUESTIONS 1 & 2: 0

## 2025-01-08 ASSESSMENT — PAIN - FUNCTIONAL ASSESSMENT
PAIN_FUNCTIONAL_ASSESSMENT: 0-10
PAIN_FUNCTIONAL_ASSESSMENT: NONE - DENIES PAIN

## 2025-01-08 ASSESSMENT — PAIN SCALES - GENERAL: PAINLEVEL_OUTOF10: 7

## 2025-01-08 NOTE — PROGRESS NOTES
Valerie Pulido   Patient is a 49 y.o. year old female who presents with:  Chief Complaint   Patient presents with    Pre-op Exam     Surg tomorrow with Dr Fink right carpal tunnel surg       HPI    History of Present Illness  The patient is a 49-year-old female who presents for a preoperative assessment.    She is scheduled for right carpal tunnel surgery tomorrow with Dr. Fink. She also has left carpal tunnel syndrome, which will be addressed surgically after the right side.    She reports a burning sensation extending from the base of her skull down her left arm. She experiences pain in certain areas of her feet, but the overall edema has improved. She manages this by elevating her feet more frequently. She is able to ascend two flights of stairs without experiencing shortness of breath. She reports no orthopnea. She reports no history of heart failure, valvular heart disease, deep vein thrombosis, or pulmonary embolism. She has not experienced any complications with anesthesia in the past. She reports no recent symptoms of chest pain, palpitations, or arrhythmias. She reports no recent episodes of chest pain, heart racing, skipped beats, or shortness of breath.    She attributes her low blood count in November 2024 to inadequate food intake due to her living conditions at the time. She reports no blood loss, hematochezia, or melena. She experiences lightheadedness and dizziness upon standing, which she suspects may be due to anemia. She recalls a recent episode of severe dizziness while performing a household task, which could have resulted in a fall had she not been assisted. She has a history of anemia following gastric bypass surgery, which was managed with oral iron supplements and vitamins, and eventually resolved. She has never required a blood transfusion. She currently takes a multivitamin and estrogen, but does not believe these are beneficial.    FAMILY HISTORY  She reports a family history of

## 2025-01-09 ENCOUNTER — TELEPHONE (OUTPATIENT)
Dept: FAMILY MEDICINE CLINIC | Age: 50
End: 2025-01-09

## 2025-01-09 VITALS
RESPIRATION RATE: 16 BRPM | SYSTOLIC BLOOD PRESSURE: 113 MMHG | TEMPERATURE: 98.1 F | OXYGEN SATURATION: 98 % | HEART RATE: 75 BPM | DIASTOLIC BLOOD PRESSURE: 67 MMHG

## 2025-01-09 LAB
ABO/RH: NORMAL
ABSOLUTE RETIC #: 0.08 M/UL
ALBUMIN: 3.7 G/DL (ref 3.5–5.2)
ALP BLD-CCNC: 126 U/L (ref 35–104)
ALT SERPL-CCNC: 11 U/L (ref 0–32)
ANION GAP SERPL CALCULATED.3IONS-SCNC: 8 MMOL/L (ref 7–16)
ANTIBODY SCREEN: NEGATIVE
ARM BAND NUMBER: NORMAL
AST SERPL-CCNC: 30 U/L (ref 0–31)
BASOPHILS ABSOLUTE: 0.09 K/UL (ref 0–0.2)
BASOPHILS RELATIVE PERCENT: 1 % (ref 0–2)
BILIRUB SERPL-MCNC: 0.2 MG/DL (ref 0–1.2)
BLOOD BANK BLOOD PRODUCT EXPIRATION DATE: NORMAL
BLOOD BANK DISPENSE STATUS: NORMAL
BLOOD BANK ISBT PRODUCT BLOOD TYPE: 5100
BLOOD BANK PRODUCT CODE: NORMAL
BLOOD BANK SAMPLE EXPIRATION: NORMAL
BLOOD BANK UNIT TYPE AND RH: NORMAL
BPU ID: NORMAL
BUN BLDV-MCNC: 14 MG/DL (ref 6–20)
CALCIUM SERPL-MCNC: 10.5 MG/DL (ref 8.6–10.2)
CHLORIDE BLD-SCNC: 113 MMOL/L (ref 98–107)
CO2: 24 MMOL/L (ref 22–29)
COMPONENT: NORMAL
CREAT SERPL-MCNC: 0.6 MG/DL (ref 0.5–1)
CROSSMATCH RESULT: NORMAL
EKG ATRIAL RATE: 73 BPM
EKG P AXIS: 52 DEGREES
EKG P-R INTERVAL: 164 MS
EKG Q-T INTERVAL: 400 MS
EKG QRS DURATION: 142 MS
EKG QTC CALCULATION (BAZETT): 440 MS
EKG R AXIS: 52 DEGREES
EKG T AXIS: 22 DEGREES
EKG VENTRICULAR RATE: 73 BPM
EOSINOPHILS ABSOLUTE: 0.24 K/UL (ref 0.05–0.5)
EOSINOPHILS RELATIVE PERCENT: 2 % (ref 0–6)
FERRITIN: 15 NG/ML
GFR, ESTIMATED: >90 ML/MIN/1.73M2
GLUCOSE BLD-MCNC: 90 MG/DL (ref 74–99)
HCT VFR BLD CALC: 26.1 % (ref 34–48)
HEMOGLOBIN: 6.7 G/DL (ref 11.5–15.5)
IMMATURE GRANULOCYTES %: 0 % (ref 0–5)
IMMATURE GRANULOCYTES ABSOLUTE: 0.04 K/UL (ref 0–0.58)
IMMATURE RETIC FRACT: 37.5 % (ref 3–15.9)
IRON: 19 UG/DL (ref 37–145)
LYMPHOCYTES ABSOLUTE: 2.34 K/UL (ref 1.5–4)
LYMPHOCYTES RELATIVE PERCENT: 21 % (ref 20–42)
MCH RBC QN AUTO: 19 PG (ref 26–35)
MCHC RBC AUTO-ENTMCNC: 25.7 G/DL (ref 32–34.5)
MCV RBC AUTO: 74.1 FL (ref 80–99.9)
MONOCYTES ABSOLUTE: 0.89 K/UL (ref 0.1–0.95)
MONOCYTES RELATIVE PERCENT: 8 % (ref 2–12)
NEUTROPHILS ABSOLUTE: 7.69 K/UL (ref 1.8–7.3)
NEUTROPHILS RELATIVE PERCENT: 68 % (ref 43–80)
PDW BLD-RTO: 24.5 % (ref 11.5–15)
PLATELET, FLUORESCENCE: 320 K/UL (ref 130–450)
PMV BLD AUTO: 10.7 FL (ref 7–12)
POTASSIUM SERPL-SCNC: 5.5 MMOL/L (ref 3.5–5)
RBC # BLD: 3.52 M/UL (ref 3.5–5.5)
RBC # BLD: ABNORMAL 10*6/UL
RETIC HEMOGLOBIN: 17.3 PG (ref 28.2–36.6)
RETICULOCYTE COUNT PCT: 2.4 % (ref 0.4–1.9)
SODIUM BLD-SCNC: 145 MMOL/L (ref 132–146)
TOTAL PROTEIN: 6.9 G/DL (ref 6.4–8.3)
TRANSFUSION STATUS: NORMAL
UNIT DIVISION: 0
UNIT ISSUE DATE/TIME: NORMAL
WBC # BLD: 11.3 K/UL (ref 4.5–11.5)

## 2025-01-09 PROCEDURE — 96361 HYDRATE IV INFUSION ADD-ON: CPT

## 2025-01-09 PROCEDURE — 6370000000 HC RX 637 (ALT 250 FOR IP): Performed by: STUDENT IN AN ORGANIZED HEALTH CARE EDUCATION/TRAINING PROGRAM

## 2025-01-09 PROCEDURE — 93010 ELECTROCARDIOGRAM REPORT: CPT | Performed by: INTERNAL MEDICINE

## 2025-01-09 RX ORDER — PANTOPRAZOLE SODIUM 40 MG/1
40 TABLET, DELAYED RELEASE ORAL
Qty: 30 TABLET | Refills: 0 | Status: SHIPPED | OUTPATIENT
Start: 2025-01-09

## 2025-01-09 RX ORDER — ACETAMINOPHEN 325 MG/1
650 TABLET ORAL ONCE
Status: COMPLETED | OUTPATIENT
Start: 2025-01-09 | End: 2025-01-09

## 2025-01-09 RX ADMIN — ACETAMINOPHEN 650 MG: 325 TABLET ORAL at 02:13

## 2025-01-09 ASSESSMENT — PAIN SCALES - GENERAL: PAINLEVEL_OUTOF10: 9

## 2025-01-09 ASSESSMENT — PAIN DESCRIPTION - LOCATION: LOCATION: HEAD

## 2025-01-09 ASSESSMENT — PAIN DESCRIPTION - ORIENTATION: ORIENTATION: MID

## 2025-01-09 ASSESSMENT — PAIN DESCRIPTION - DESCRIPTORS: DESCRIPTORS: ACHING

## 2025-01-09 ASSESSMENT — PAIN - FUNCTIONAL ASSESSMENT: PAIN_FUNCTIONAL_ASSESSMENT: ACTIVITIES ARE NOT PREVENTED

## 2025-01-09 NOTE — ED PROVIDER NOTES
(2004);  section (2000); Partial hysterectomy; Tubal ligation (2001); ECHO Compl W Dop Color Flow (2012); Knee arthroscopy (Right, 1984); Nerve Block (2013); Nerve Block; Nerve Block (2013); Tonsillectomy and adenoidectomy (1977); Shannon-en-Y Gastric Bypass (2004); Hysterectomy; back surgery (2014); Colonoscopy; Endoscopy, colon, diagnostic; and Cholecystectomy, laparoscopic (2016).    Social History:  reports that she has been smoking cigarettes. She started smoking about 12 years ago. She has a 18 pack-year smoking history. She has never used smokeless tobacco. She reports that she does not currently use drugs after having used the following drugs: Marijuana (Weed). She reports that she does not drink alcohol.    Family History: family history includes Cancer in her maternal aunt; Diabetes in her mother; Heart Attack in her father and maternal uncle; Heart Attack (age of onset: 55) in her mother; Heart Disease in an other family member.     The patient’s home medications have been reviewed.    Allergies: Tetanus toxoids, Ancef [cefazolin sodium], Lithium, and Morphine    -------------------------------------------------- RESULTS -------------------------------------------------  Labs:  Results for orders placed or performed during the hospital encounter of 25   CBC with Auto Differential   Result Value Ref Range    WBC 9.0 4.5 - 11.5 k/uL    RBC 3.57 3.50 - 5.50 m/uL    Hemoglobin 6.9 (LL) 11.5 - 15.5 g/dL    Hematocrit 25.7 (L) 34.0 - 48.0 %    MCV 72.0 (L) 80.0 - 99.9 fL    MCH 19.3 (L) 26.0 - 35.0 pg    MCHC 26.8 (L) 32.0 - 34.5 g/dL    RDW 24.3 (H) 11.5 - 15.0 %    MPV 10.2 7.0 - 12.0 fL    Platelet, Fluorescence 305 130 - 450 k/uL    Neutrophils % 74 43.0 - 80.0 %    Lymphocytes % 19 (L) 20.0 - 42.0 %    Monocytes % 5 2.0 - 12.0 %    Eosinophils % 2 0 - 6 %    Basophils % 1 0.0 - 2.0 %    Neutrophils Absolute 6.67 1.80 - 7.30 k/uL

## 2025-01-09 NOTE — TELEPHONE ENCOUNTER
Spoke with surgical center; Surg will be postponed due to Low Hgb; they do not transfuse at Surg center and pt comes one hr prior to surg and needs cleared and well before then they do not re run testing; Attempted to call pt but left voicemail surg will be rescheduled and please call the office so I can see what ER did, how she's feeling

## 2025-01-09 NOTE — DISCHARGE INSTRUCTIONS
You were seen for anemia today.  If you start throwing up blood or have dark tarry stool having lightheaded you should return to the emergency room.  If you see bleeding from an MRI should return to the emergency room

## 2025-01-10 ENCOUNTER — TELEPHONE (OUTPATIENT)
Dept: FAMILY MEDICINE CLINIC | Age: 50
End: 2025-01-10

## 2025-01-10 NOTE — TELEPHONE ENCOUNTER
Spoke with Dr Bhagat pt needs appt next week; left voicemail forpt to call Monday and schedule an appt for next week; call the office and we will get her in

## 2025-01-10 NOTE — TELEPHONE ENCOUNTER
Wants to know what she should do about her blood count; should she come in or get re drawn? Fatigued and woozy

## 2025-01-16 ENCOUNTER — OFFICE VISIT (OUTPATIENT)
Dept: FAMILY MEDICINE CLINIC | Age: 50
End: 2025-01-16
Payer: COMMERCIAL

## 2025-01-16 VITALS
TEMPERATURE: 97 F | SYSTOLIC BLOOD PRESSURE: 118 MMHG | RESPIRATION RATE: 16 BRPM | HEART RATE: 82 BPM | DIASTOLIC BLOOD PRESSURE: 72 MMHG | WEIGHT: 145 LBS | OXYGEN SATURATION: 98 % | HEIGHT: 60 IN | BODY MASS INDEX: 28.47 KG/M2

## 2025-01-16 DIAGNOSIS — F41.9 ANXIETY AND DEPRESSION: ICD-10-CM

## 2025-01-16 DIAGNOSIS — R10.13 EPIGASTRIC PAIN: ICD-10-CM

## 2025-01-16 DIAGNOSIS — D50.9 MICROCYTIC ANEMIA: ICD-10-CM

## 2025-01-16 DIAGNOSIS — I10 ESSENTIAL HYPERTENSION: ICD-10-CM

## 2025-01-16 DIAGNOSIS — G56.03 BILATERAL CARPAL TUNNEL SYNDROME: ICD-10-CM

## 2025-01-16 DIAGNOSIS — R26.2 AMBULATORY DYSFUNCTION: ICD-10-CM

## 2025-01-16 DIAGNOSIS — E78.5 DYSLIPIDEMIA: Primary | ICD-10-CM

## 2025-01-16 DIAGNOSIS — R09.81 NASAL CONGESTION: ICD-10-CM

## 2025-01-16 DIAGNOSIS — R11.0 NAUSEA: ICD-10-CM

## 2025-01-16 DIAGNOSIS — F32.A ANXIETY AND DEPRESSION: ICD-10-CM

## 2025-01-16 DIAGNOSIS — E78.5 DYSLIPIDEMIA: ICD-10-CM

## 2025-01-16 DIAGNOSIS — R60.0 BILATERAL LOWER EXTREMITY EDEMA: ICD-10-CM

## 2025-01-16 LAB
ALBUMIN: 3.6 G/DL (ref 3.5–5.2)
ALP BLD-CCNC: 120 U/L (ref 35–104)
ALT SERPL-CCNC: 10 U/L (ref 0–32)
ANION GAP SERPL CALCULATED.3IONS-SCNC: 7 MMOL/L (ref 7–16)
AST SERPL-CCNC: 28 U/L (ref 0–31)
BILIRUB SERPL-MCNC: 0.2 MG/DL (ref 0–1.2)
BUN BLDV-MCNC: 12 MG/DL (ref 6–20)
CALCIUM SERPL-MCNC: 10.1 MG/DL (ref 8.6–10.2)
CHLORIDE BLD-SCNC: 111 MMOL/L (ref 98–107)
CO2: 27 MMOL/L (ref 22–29)
CREAT SERPL-MCNC: 0.6 MG/DL (ref 0.5–1)
GFR, ESTIMATED: >90 ML/MIN/1.73M2
GLUCOSE BLD-MCNC: 88 MG/DL (ref 74–99)
POTASSIUM SERPL-SCNC: 6.1 MMOL/L (ref 3.5–5)
SODIUM BLD-SCNC: 145 MMOL/L (ref 132–146)
TOTAL PROTEIN: 6.4 G/DL (ref 6.4–8.3)

## 2025-01-16 PROCEDURE — 3078F DIAST BP <80 MM HG: CPT | Performed by: FAMILY MEDICINE

## 2025-01-16 PROCEDURE — 99214 OFFICE O/P EST MOD 30 MIN: CPT | Performed by: FAMILY MEDICINE

## 2025-01-16 PROCEDURE — 3074F SYST BP LT 130 MM HG: CPT | Performed by: FAMILY MEDICINE

## 2025-01-16 RX ORDER — DULOXETIN HYDROCHLORIDE 60 MG/1
60 CAPSULE, DELAYED RELEASE ORAL DAILY
Qty: 90 CAPSULE | Refills: 0 | Status: SHIPPED | OUTPATIENT
Start: 2025-01-16

## 2025-01-16 RX ORDER — FLUTICASONE PROPIONATE 50 MCG
1 SPRAY, SUSPENSION (ML) NASAL DAILY PRN
Qty: 1 EACH | Refills: 0 | Status: SHIPPED | OUTPATIENT
Start: 2025-01-16

## 2025-01-16 RX ORDER — FERROUS SULFATE 325(65) MG
325 TABLET ORAL 2 TIMES DAILY
Qty: 180 TABLET | Refills: 0 | Status: SHIPPED | OUTPATIENT
Start: 2025-01-16

## 2025-01-16 RX ORDER — FUROSEMIDE 20 MG/1
20 TABLET ORAL DAILY PRN
Qty: 30 TABLET | Refills: 0 | Status: SHIPPED | OUTPATIENT
Start: 2025-01-16

## 2025-01-16 RX ORDER — PANTOPRAZOLE SODIUM 40 MG/1
40 TABLET, DELAYED RELEASE ORAL DAILY
Qty: 90 TABLET | Refills: 0 | Status: SHIPPED | OUTPATIENT
Start: 2025-01-16

## 2025-01-16 RX ORDER — ATORVASTATIN CALCIUM 20 MG/1
20 TABLET, FILM COATED ORAL NIGHTLY
Qty: 90 TABLET | Refills: 0 | Status: SHIPPED | OUTPATIENT
Start: 2025-01-16

## 2025-01-16 RX ORDER — ONDANSETRON 4 MG/1
4 TABLET, ORALLY DISINTEGRATING ORAL 3 TIMES DAILY PRN
Qty: 21 TABLET | Refills: 0 | Status: SHIPPED | OUTPATIENT
Start: 2025-01-16

## 2025-01-16 SDOH — ECONOMIC STABILITY: FOOD INSECURITY: WITHIN THE PAST 12 MONTHS, YOU WORRIED THAT YOUR FOOD WOULD RUN OUT BEFORE YOU GOT MONEY TO BUY MORE.: PATIENT DECLINED

## 2025-01-16 SDOH — ECONOMIC STABILITY: FOOD INSECURITY: WITHIN THE PAST 12 MONTHS, THE FOOD YOU BOUGHT JUST DIDN'T LAST AND YOU DIDN'T HAVE MONEY TO GET MORE.: PATIENT DECLINED

## 2025-01-16 NOTE — PROGRESS NOTES
is no evidence of acute blood loss. She received a transfusion in the emergency room, with some subsequent improvement in symptoms. A blood count will be rechecked today. She is advised to continue her iron supplement 65 mg twice daily, with a prescription sent to [facility]. If she experiences intolerance, she should reduce the dosage to once daily or half a tablet twice daily. She is also advised to schedule an appointment with a primary care physician in [place] as soon as possible after her move. If her hemoglobin level remains below 7, an outpatient transfusion will be arranged.    2. Hypertension.  Her blood pressure is well-controlled on her current regimen of amlodipine 10 mg daily and propranolol. She should continue her current medications.    3. Hyperlipidemia.  She is currently taking atorvastatin 20 mg nightly. She should continue this medication as prescribed.    4. Anxiety and depression.  She is taking Cymbalta 60 mg daily and Lyrica for her symptoms. She reports that her symptoms are not well controlled. She is advised to continue her current medications and consider discussing mood stabilizers with her psychiatrist, especially in light of her menopausal symptoms.    5. Edema.  She is taking Lasix 20 mg daily as needed for leg swelling. She should continue this medication as prescribed.    6. Nausea.  She is using Zofran as needed for nausea and vomiting. She should continue this medication as prescribed.    7. Gastroesophageal reflux disease.  She needs a refill of Protonix 40 mg daily and Flonase. Prescriptions for these medications have been sent to [facility].    8. Carpal tunnel syndrome.  She has a handicap placard and uses splints for bilateral carpal tunnel syndrome. A prescription for cock-up splints has been sent to [facility].    PROCEDURE  The patient underwent gastric bypass surgery in the past.    1. Dyslipidemia  -     atorvastatin (LIPITOR) 20 MG tablet; Take 1 tablet by mouth nightly,

## 2025-01-17 ENCOUNTER — TELEPHONE (OUTPATIENT)
Dept: FAMILY MEDICINE CLINIC | Age: 50
End: 2025-01-17

## 2025-01-17 DIAGNOSIS — D50.9 MICROCYTIC ANEMIA: ICD-10-CM

## 2025-01-17 DIAGNOSIS — E87.5 HYPERKALEMIA: Primary | ICD-10-CM

## 2025-01-17 LAB
BASOPHILS ABSOLUTE: 0.31 K/UL (ref 0–0.2)
BASOPHILS RELATIVE PERCENT: 2 % (ref 0–2)
EOSINOPHILS ABSOLUTE: 0.16 K/UL (ref 0.05–0.5)
EOSINOPHILS RELATIVE PERCENT: 1 % (ref 0–6)
HCT VFR BLD CALC: 31.8 % (ref 34–48)
HEMOGLOBIN: 8.6 G/DL (ref 11.5–15.5)
LYMPHOCYTES ABSOLUTE: 1.57 K/UL (ref 1.5–4)
LYMPHOCYTES RELATIVE PERCENT: 9 % (ref 20–42)
MCH RBC QN AUTO: 20.9 PG (ref 26–35)
MCHC RBC AUTO-ENTMCNC: 27 G/DL (ref 32–34.5)
MCV RBC AUTO: 77.2 FL (ref 80–99.9)
MONOCYTES ABSOLUTE: 1.41 K/UL (ref 0.1–0.95)
MONOCYTES RELATIVE PERCENT: 8 % (ref 2–12)
NEUTROPHILS ABSOLUTE: 14.45 K/UL (ref 1.8–7.3)
NEUTROPHILS RELATIVE PERCENT: 81 % (ref 43–80)
PDW BLD-RTO: 25.7 % (ref 11.5–15)
PLATELET # BLD: 507 K/UL (ref 130–450)
PMV BLD AUTO: 10.8 FL (ref 7–12)
RBC # BLD: 4.12 M/UL (ref 3.5–5.5)
RBC # BLD: ABNORMAL 10*6/UL
WBC # BLD: 17.9 K/UL (ref 4.5–11.5)

## 2025-01-17 NOTE — TELEPHONE ENCOUNTER
Please advise patient that anemia shows interval improvement but results showed elevated potassium and WBC and I recommend repeat labs mid next week.  Orders placed

## 2025-01-27 NOTE — TELEPHONE ENCOUNTER
Pt called for refill    Name of Medication(s) Requested:  Requested Prescriptions     Pending Prescriptions Disp Refills    amLODIPine (NORVASC) 10 MG tablet 90 tablet 0     Sig: Take 1 tablet by mouth daily am       Medication is on current medication list Yes    Dosage and directions were verified? Yes    Quantity verified: 90 day supply     Pharmacy Verified?  Yes    Last Appointment:  1/16/2025    Future appts:  Future Appointments   Date Time Provider Department Center   2/5/2025 10:30 AM Shahram Bhagat DO [facility]   2/11/2025 11:30 AM Luz Olivares PA [facility]   5/8/2025 10:20 AM Lucinda Sweeney MD [facility] NEURO Neurology -        (If no appt send self scheduling link. .REFILLAPPT)  Scheduling request sent?     [] Yes  [x] No    Does patient need updated?  [] Yes  [x] No

## 2025-01-28 ENCOUNTER — HOSPITAL ENCOUNTER (OUTPATIENT)
Age: 50
Discharge: HOME | End: 2025-01-28
Payer: COMMERCIAL

## 2025-01-28 DIAGNOSIS — E87.5 HYPERKALEMIA: ICD-10-CM

## 2025-01-28 DIAGNOSIS — D50.9 MICROCYTIC ANEMIA: ICD-10-CM

## 2025-01-28 LAB
ALBUMIN SERPL-MCNC: 3.8 G/DL (ref 3.5–5.2)
ALP SERPL-CCNC: 112 U/L (ref 35–104)
ALT SERPL-CCNC: 13 U/L (ref 0–32)
ANION GAP SERPL CALCULATED.3IONS-SCNC: 7 MMOL/L (ref 7–16)
AST SERPL-CCNC: 25 U/L (ref 0–31)
BASOPHILS # BLD: 0.14 K/UL (ref 0–0.2)
BASOPHILS NFR BLD: 2 % (ref 0–2)
BILIRUB SERPL-MCNC: <0.2 MG/DL (ref 0–1.2)
BUN SERPL-MCNC: 9 MG/DL (ref 6–20)
CALCIUM SERPL-MCNC: 10.1 MG/DL (ref 8.6–10.2)
CHLORIDE SERPL-SCNC: 109 MMOL/L (ref 98–107)
CO2 SERPL-SCNC: 30 MMOL/L (ref 22–29)
CREAT SERPL-MCNC: 0.6 MG/DL (ref 0.5–1)
EOSINOPHIL # BLD: 0.07 K/UL (ref 0.05–0.5)
EOSINOPHILS RELATIVE PERCENT: 1 % (ref 0–6)
ERYTHROCYTE [DISTWIDTH] IN BLOOD BY AUTOMATED COUNT: 32 % (ref 11.5–15)
GFR, ESTIMATED: >90 ML/MIN/1.73M2
GLUCOSE SERPL-MCNC: 89 MG/DL (ref 74–99)
HCT VFR BLD AUTO: 37.6 % (ref 34–48)
HGB BLD-MCNC: 10.2 G/DL (ref 11.5–15.5)
LYMPHOCYTES NFR BLD: 1.93 K/UL (ref 1.5–4)
LYMPHOCYTES RELATIVE PERCENT: 25 % (ref 20–42)
MCH RBC QN AUTO: 22.9 PG (ref 26–35)
MCHC RBC AUTO-ENTMCNC: 27.1 G/DL (ref 32–34.5)
MCV RBC AUTO: 84.5 FL (ref 80–99.9)
MONOCYTES NFR BLD: 0.43 K/UL (ref 0.1–0.95)
MONOCYTES NFR BLD: 6 % (ref 2–12)
NEUTROPHILS NFR BLD: 67 % (ref 43–80)
NEUTS SEG NFR BLD: 5.22 K/UL (ref 1.8–7.3)
PLATELET # BLD AUTO: 242 K/UL (ref 130–450)
PMV BLD AUTO: 10.4 FL (ref 7–12)
POTASSIUM SERPL-SCNC: 5.6 MMOL/L (ref 3.5–5)
PROT SERPL-MCNC: 6.3 G/DL (ref 6.4–8.3)
RBC # BLD AUTO: 4.45 M/UL (ref 3.5–5.5)
RBC # BLD: ABNORMAL 10*6/UL
SODIUM SERPL-SCNC: 146 MMOL/L (ref 132–146)
WBC OTHER # BLD: 7.8 K/UL (ref 4.5–11.5)

## 2025-01-28 PROCEDURE — 36415 COLL VENOUS BLD VENIPUNCTURE: CPT

## 2025-01-28 PROCEDURE — 80053 COMPREHEN METABOLIC PANEL: CPT

## 2025-01-28 PROCEDURE — 85025 COMPLETE CBC W/AUTO DIFF WBC: CPT

## 2025-01-28 RX ORDER — AMLODIPINE BESYLATE 10 MG/1
10 TABLET ORAL DAILY
Qty: 90 TABLET | Refills: 0 | Status: SHIPPED | OUTPATIENT
Start: 2025-01-28

## 2025-02-11 ENCOUNTER — OFFICE VISIT (OUTPATIENT)
Dept: PAIN MANAGEMENT | Age: 50
End: 2025-02-11
Payer: COMMERCIAL

## 2025-02-11 VITALS
RESPIRATION RATE: 18 BRPM | TEMPERATURE: 97 F | HEART RATE: 68 BPM | WEIGHT: 142 LBS | DIASTOLIC BLOOD PRESSURE: 78 MMHG | BODY MASS INDEX: 27.88 KG/M2 | SYSTOLIC BLOOD PRESSURE: 119 MMHG | HEIGHT: 60 IN | OXYGEN SATURATION: 94 %

## 2025-02-11 DIAGNOSIS — M54.2 CERVICALGIA: ICD-10-CM

## 2025-02-11 DIAGNOSIS — M79.10 MYALGIA: ICD-10-CM

## 2025-02-11 DIAGNOSIS — G89.4 CHRONIC PAIN SYNDROME: Primary | ICD-10-CM

## 2025-02-11 DIAGNOSIS — M79.7 FIBROMYALGIA: ICD-10-CM

## 2025-02-11 PROCEDURE — 3078F DIAST BP <80 MM HG: CPT | Performed by: PHYSICIAN ASSISTANT

## 2025-02-11 PROCEDURE — 3074F SYST BP LT 130 MM HG: CPT | Performed by: PHYSICIAN ASSISTANT

## 2025-02-11 PROCEDURE — 99213 OFFICE O/P EST LOW 20 MIN: CPT | Performed by: PHYSICIAN ASSISTANT

## 2025-02-11 RX ORDER — METHOCARBAMOL 500 MG/1
500 TABLET, FILM COATED ORAL 2 TIMES DAILY PRN
Qty: 60 TABLET | Refills: 0 | Status: SHIPPED | OUTPATIENT
Start: 2025-02-11 | End: 2025-03-13

## 2025-02-11 RX ORDER — PREGABALIN 150 MG/1
150 CAPSULE ORAL 3 TIMES DAILY
Qty: 270 CAPSULE | Refills: 0 | Status: SHIPPED | OUTPATIENT
Start: 2025-02-11 | End: 2025-05-12

## 2025-02-11 NOTE — PROGRESS NOTES
[facility]   [address], [place] [address]  [phone]    Follow up Note      Valerie Pulido     Date of Visit:  2025    CC:  Patient presents for follow up   Chief Complaint   Patient presents with    Follow-up     Follow up multiple joint chronic pain.       HPI:    Pain is worse to her head, neck, and arms.  Appropriate analgesia with current medications regimen: yes - somewhat.    Change in quality of symptoms:no.    Medication side effects:none.   Recent diagnostic testing:none.   Recent interventional procedures:none.    She has not been on anticoagulation medications to include ASA, NSAIDS, Plavix, heparin, LMW heparin, and warfarin and has not been on herbal supplements.  She is not diabetic.     Imagin/2024 EDX -   Diagnostic Interpretation:   This study was Abnormal     Electrodiagnosis: Extensive electrodiagnostic examination of bilateral upper extremities is somewhat limited by incomplete motor unit activation in several muscles examined by needle. This may be secondary to pain, incomplete voluntary effort or a central disorder of motor unit control. Nevertheless, the study reveals evidence of bilateral median neuropathy at or distal to the wrists, consistent with a clinical diagnosis of bilateral carpal tunnel syndrome, severe in degree electrically in the left and mild in degree in the right. There is no evidence of a generalized sensorimotor peripheral neuropathy of the large fiber type or a left cervical motor radiculopathy.             Previous Study: emg/NCS of right arm done in 2016 reveals evidence of borderline carpal tunnel syndrome in the right.    The present study shows progression of severity of right median neuropathy.      Follow up EMG is recommended if clinically indicated or if there is clinical progression .            Physician:Lucinda Sweeney MD     2024 MRI brain w/o -  FINDINGS:  INTRACRANIAL STRUCTURES/VENTRICLES:

## 2025-02-11 NOTE — PROGRESS NOTES
Valerie Pulido presents to the [facility] on 2/11/2025. Valerie is complaining of pain in multiple areas. The pain is constant. The pain is described as aching, throbbing, shooting, stabbing, sharp, burning, and penetrating. Pain is rated on her best day at a 7, on her worst day at a 10, and on average at a 7 on the VAS scale. She took her last dose of Lyrica this morning..      Any procedures since your last visit: No.    She is not on NSAIDS and  is  on anticoagulation medications to include ASA and is managed by Dr. Bhagat.     Pacemaker or defibrillator: No.    Do you want someone present when the provider examines you? No    Medication Contract and Consent for Opioid Use Documents Filed       Patient Documents       Type of Document Status Date Received Received By Description    Medication Contract [Status Missing]  MICHELE DAVIS 4/21/14 Neurosurgery Medication Contract                       /78 (Site: Right Upper Arm, Position: Sitting, Cuff Size: Medium Adult)   Pulse 68   Temp 97 °F (36.1 °C) (Temporal)   Resp 18   Ht 1.524 m (5')   Wt 64.4 kg (142 lb)   SpO2 94%   BMI 27.73 kg/m²      No LMP recorded. Patient has had a hysterectomy.

## 2025-02-12 ENCOUNTER — TELEPHONE (OUTPATIENT)
Dept: FAMILY MEDICINE CLINIC | Age: 50
End: 2025-02-12

## 2025-02-12 DIAGNOSIS — D50.9 MICROCYTIC ANEMIA: Primary | ICD-10-CM

## 2025-02-12 NOTE — TELEPHONE ENCOUNTER
Patient previously reported she was planning to move outside of the [place].  Please inquire whether this is still her plan.  CBC ordered

## 2025-02-12 NOTE — TELEPHONE ENCOUNTER
Pt is requesting labs to check her hemoglobin. She stated that she feels dizzy at times, off balance and fatigue.     Last seen 1/16/2025  Next appt Visit date not found

## 2025-02-14 ENCOUNTER — HOSPITAL ENCOUNTER (OUTPATIENT)
Age: 50
Discharge: HOME | End: 2025-02-14
Payer: COMMERCIAL

## 2025-02-14 DIAGNOSIS — D50.9 MICROCYTIC ANEMIA: ICD-10-CM

## 2025-02-14 LAB
BASOPHILS # BLD: 0.08 K/UL (ref 0–0.2)
BASOPHILS NFR BLD: 1 % (ref 0–2)
EOSINOPHIL # BLD: 0.32 K/UL (ref 0.05–0.5)
EOSINOPHILS RELATIVE PERCENT: 4 % (ref 0–6)
ERYTHROCYTE [DISTWIDTH] IN BLOOD BY AUTOMATED COUNT: ABNORMAL % (ref 11.5–15)
HCT VFR BLD AUTO: 43.1 % (ref 34–48)
HGB BLD-MCNC: 12.4 G/DL (ref 11.5–15.5)
LYMPHOCYTES NFR BLD: 1.12 K/UL (ref 1.5–4)
LYMPHOCYTES RELATIVE PERCENT: 12 % (ref 20–42)
MCH RBC QN AUTO: 26.4 PG (ref 26–35)
MCHC RBC AUTO-ENTMCNC: 28.8 G/DL (ref 32–34.5)
MCV RBC AUTO: 91.9 FL (ref 80–99.9)
MONOCYTES NFR BLD: 0.4 K/UL (ref 0.1–0.95)
MONOCYTES NFR BLD: 4 % (ref 2–12)
NEUTROPHILS NFR BLD: 79 % (ref 43–80)
NEUTS SEG NFR BLD: 7.18 K/UL (ref 1.8–7.3)
PLATELET, FLUORESCENCE: 297 K/UL (ref 130–450)
PMV BLD AUTO: 9.8 FL (ref 7–12)
RBC # BLD AUTO: 4.69 M/UL (ref 3.5–5.5)
RBC # BLD: ABNORMAL 10*6/UL
WBC OTHER # BLD: 9.1 K/UL (ref 4.5–11.5)

## 2025-02-14 PROCEDURE — 85025 COMPLETE CBC W/AUTO DIFF WBC: CPT

## 2025-02-14 PROCEDURE — 36415 COLL VENOUS BLD VENIPUNCTURE: CPT
